# Patient Record
Sex: MALE | Race: ASIAN | Employment: FULL TIME | URBAN - METROPOLITAN AREA
[De-identification: names, ages, dates, MRNs, and addresses within clinical notes are randomized per-mention and may not be internally consistent; named-entity substitution may affect disease eponyms.]

---

## 2017-05-13 RX ORDER — VALSARTAN 160 MG/1
TABLET ORAL
Qty: 90 TABLET | Refills: 3 | Status: SHIPPED | OUTPATIENT
Start: 2017-05-13 | End: 2018-05-19

## 2018-05-21 RX ORDER — VALSARTAN 160 MG/1
TABLET ORAL
Qty: 90 TABLET | Refills: 0 | Status: SHIPPED | OUTPATIENT
Start: 2018-05-21 | End: 2018-07-12

## 2018-05-21 NOTE — TELEPHONE ENCOUNTER
Call center please call and schedule an appointment. Thank You.     PLEASE INFORM PATIENT, OFFICE VISIT REQUIRED PRIOR TO ANY ADDITIONAL REFILLS   (Routing comment)

## 2018-07-12 ENCOUNTER — OFFICE VISIT (OUTPATIENT)
Dept: INTERNAL MEDICINE CLINIC | Facility: CLINIC | Age: 48
End: 2018-07-12

## 2018-07-12 VITALS
DIASTOLIC BLOOD PRESSURE: 84 MMHG | BODY MASS INDEX: 30.92 KG/M2 | WEIGHT: 204 LBS | SYSTOLIC BLOOD PRESSURE: 132 MMHG | HEART RATE: 69 BPM | HEIGHT: 68 IN | TEMPERATURE: 98 F

## 2018-07-12 DIAGNOSIS — I10 ESSENTIAL HYPERTENSION: ICD-10-CM

## 2018-07-12 DIAGNOSIS — L72.3 SEBACEOUS CYST: ICD-10-CM

## 2018-07-12 DIAGNOSIS — Z00.00 ROUTINE GENERAL MEDICAL EXAMINATION AT A HEALTH CARE FACILITY: Primary | ICD-10-CM

## 2018-07-12 PROCEDURE — 99396 PREV VISIT EST AGE 40-64: CPT | Performed by: INTERNAL MEDICINE

## 2018-07-12 RX ORDER — VALSARTAN 160 MG/1
TABLET ORAL
Qty: 90 TABLET | Refills: 3 | Status: SHIPPED | OUTPATIENT
Start: 2018-07-12 | End: 2018-08-20

## 2018-07-12 NOTE — PROGRESS NOTES
HPI:    Patient ID: Freddy Renae is a 53year old male.     HPI     Physical exam    /84   Pulse 69   Temp 98.1 °F (36.7 °C) (Oral)   Ht 5' 8\" (1.727 m)   Wt 204 lb (92.5 kg)   BMI 31.02 kg/m²   Wt Readings from Last 6 Encounters:  07/12/18 Mother    • Hypertension Mother    • Diabetes Maternal Grandfather       Social History: Smoking status: Never Smoker                                                              Smokeless tobacco: Never Used                      Alcohol use:  Yes (92.5 kg)   BMI 31.02 kg/m²   Controlled  CPM  Low salt diet  WT loss  Exercise advsed    Medication reviewed. Refill will be given as needed . Mediation side effect reviewed. Most recent laboratory and diagnostic testing reviewed.   Dietary and lifestyle

## 2018-08-20 RX ORDER — VALSARTAN 160 MG/1
TABLET ORAL
Qty: 90 TABLET | Refills: 0 | OUTPATIENT
Start: 2018-08-20

## 2018-10-01 ENCOUNTER — TELEPHONE (OUTPATIENT)
Dept: OTHER | Age: 48
End: 2018-10-01

## 2018-10-01 NOTE — TELEPHONE ENCOUNTER
Patient states he switched BP meds about two weeks ago from valsartan to losartan, and states that the losartan hasn't been working well. He states his BP:154/108 on last Thursday.  He states that usually his BP ran about 136/85 on Valsartan, but now on los

## 2018-10-02 NOTE — TELEPHONE ENCOUNTER
Increased losartan yesterday  Sent to pharmacy  Monitor home BP  Follow up in a month  Low salt diet advised

## 2019-10-06 RX ORDER — LOSARTAN POTASSIUM 100 MG/1
TABLET ORAL
Qty: 90 TABLET | Refills: 1 | Status: SHIPPED | OUTPATIENT
Start: 2019-10-06 | End: 2020-03-28

## 2019-10-06 NOTE — TELEPHONE ENCOUNTER
Please review; protocol failed.     Requested Prescriptions   Pending Prescriptions Disp Refills   • LOSARTAN 100 MG Oral Tab [Pharmacy Med Name: LOSARTAN 100MG TABLETS] 90 tablet 0     Sig: TAKE 1 TABLET(100 MG) BY MOUTH DAILY       Hypertensive Medication

## 2020-03-28 RX ORDER — LOSARTAN POTASSIUM 100 MG/1
TABLET ORAL
Qty: 90 TABLET | Refills: 1 | Status: SHIPPED | OUTPATIENT
Start: 2020-03-28 | End: 2020-09-17

## 2020-03-28 NOTE — TELEPHONE ENCOUNTER
BS 53; rechecked BS 50. Dr Shante Orozco notified. Pt denies c/o's; assymptomatic. Given cranberry juice 240mls and saltines.   Will recheck Please review refill

## 2020-09-16 ENCOUNTER — TELEPHONE (OUTPATIENT)
Dept: INTERNAL MEDICINE CLINIC | Facility: CLINIC | Age: 50
End: 2020-09-16

## 2020-09-23 ENCOUNTER — OFFICE VISIT (OUTPATIENT)
Dept: INTERNAL MEDICINE CLINIC | Facility: CLINIC | Age: 50
End: 2020-09-23

## 2020-09-23 VITALS
HEART RATE: 90 BPM | HEIGHT: 68 IN | TEMPERATURE: 99 F | DIASTOLIC BLOOD PRESSURE: 88 MMHG | WEIGHT: 195 LBS | BODY MASS INDEX: 29.55 KG/M2 | SYSTOLIC BLOOD PRESSURE: 148 MMHG

## 2020-09-23 DIAGNOSIS — I10 ESSENTIAL HYPERTENSION: ICD-10-CM

## 2020-09-23 DIAGNOSIS — Z12.5 SCREENING FOR PROSTATE CANCER: ICD-10-CM

## 2020-09-23 DIAGNOSIS — Z00.00 ROUTINE GENERAL MEDICAL EXAMINATION AT A HEALTH CARE FACILITY: Primary | ICD-10-CM

## 2020-09-23 PROCEDURE — 3008F BODY MASS INDEX DOCD: CPT | Performed by: INTERNAL MEDICINE

## 2020-09-23 PROCEDURE — 3077F SYST BP >= 140 MM HG: CPT | Performed by: INTERNAL MEDICINE

## 2020-09-23 PROCEDURE — 99396 PREV VISIT EST AGE 40-64: CPT | Performed by: INTERNAL MEDICINE

## 2020-09-23 PROCEDURE — 90471 IMMUNIZATION ADMIN: CPT | Performed by: INTERNAL MEDICINE

## 2020-09-23 PROCEDURE — 3079F DIAST BP 80-89 MM HG: CPT | Performed by: INTERNAL MEDICINE

## 2020-09-23 PROCEDURE — 90686 IIV4 VACC NO PRSV 0.5 ML IM: CPT | Performed by: INTERNAL MEDICINE

## 2020-09-23 RX ORDER — AMLODIPINE BESYLATE 5 MG/1
5 TABLET ORAL DAILY
Qty: 90 TABLET | Refills: 3 | Status: SHIPPED | OUTPATIENT
Start: 2020-09-23 | End: 2021-09-07

## 2020-09-23 NOTE — PROGRESS NOTES
HPI:    Patient ID: Maricruz Ward is a 53year old male.     HPI    Physical exam  HTN  Long standing history of hypertension     sympotms  :        Headache no  dizziness        no                             Blurred vision no  palpitaionsSyncope n daily. 90 tablet 3   • losartan 100 MG Oral Tab Take 1 tablet (100 mg total) by mouth daily.  90 tablet 1   • Fluocinonide 0.05 % External Cream Apply topically twice per day to affected area (Patient not taking: Reported on 9/23/2020 ) 120 g 3     Allergie Routine general medical examination at a health care facility  (primary encounter diagnosis)  Plan: ASSAY, THYROID STIM HORMONE, FREE T4 (FREE         THYROXINE), LIPID PANEL, CBC, PLATELET; NO         DIFFERENTIAL, COMP METABOLIC PANEL (14),         HEMOG

## 2020-09-25 ENCOUNTER — LAB ENCOUNTER (OUTPATIENT)
Dept: LAB | Age: 50
End: 2020-09-25
Attending: INTERNAL MEDICINE
Payer: COMMERCIAL

## 2020-09-25 DIAGNOSIS — Z00.00 ROUTINE GENERAL MEDICAL EXAMINATION AT A HEALTH CARE FACILITY: ICD-10-CM

## 2020-09-25 DIAGNOSIS — Z12.5 SCREENING FOR PROSTATE CANCER: ICD-10-CM

## 2020-09-25 LAB
ALBUMIN SERPL-MCNC: 3.9 G/DL (ref 3.4–5)
ALBUMIN/GLOB SERPL: 1 {RATIO} (ref 1–2)
ALP LIVER SERPL-CCNC: 52 U/L
ALT SERPL-CCNC: 38 U/L
ANION GAP SERPL CALC-SCNC: 8 MMOL/L (ref 0–18)
AST SERPL-CCNC: 27 U/L (ref 15–37)
BACTERIA UR QL AUTO: NEGATIVE /HPF
BILIRUB SERPL-MCNC: 0.5 MG/DL (ref 0.1–2)
BUN BLD-MCNC: 19 MG/DL (ref 7–18)
BUN/CREAT SERPL: 15.3 (ref 10–20)
CALCIUM BLD-MCNC: 9.3 MG/DL (ref 8.5–10.1)
CHLORIDE SERPL-SCNC: 106 MMOL/L (ref 98–112)
CHOLEST SMN-MCNC: 219 MG/DL (ref ?–200)
CO2 SERPL-SCNC: 24 MMOL/L (ref 21–32)
COMPLEXED PSA SERPL-MCNC: 0.99 NG/ML (ref ?–4)
CREAT BLD-MCNC: 1.24 MG/DL
DEPRECATED RDW RBC AUTO: 37.6 FL (ref 35.1–46.3)
ERYTHROCYTE [DISTWIDTH] IN BLOOD BY AUTOMATED COUNT: 10.8 % (ref 11–15)
EST. AVERAGE GLUCOSE BLD GHB EST-MCNC: 114 MG/DL (ref 68–126)
GLOBULIN PLAS-MCNC: 3.9 G/DL (ref 2.8–4.4)
GLUCOSE BLD-MCNC: 147 MG/DL (ref 70–99)
HBA1C MFR BLD HPLC: 5.6 % (ref ?–5.7)
HCT VFR BLD AUTO: 41.4 %
HDLC SERPL-MCNC: 39 MG/DL (ref 40–59)
HGB BLD-MCNC: 14.3 G/DL
LDLC SERPL DIRECT ASSAY-MCNC: 84 MG/DL (ref ?–100)
M PROTEIN MFR SERPL ELPH: 7.8 G/DL (ref 6.4–8.2)
MCH RBC QN AUTO: 32.7 PG (ref 26–34)
MCHC RBC AUTO-ENTMCNC: 34.5 G/DL (ref 31–37)
MCV RBC AUTO: 94.7 FL
NONHDLC SERPL-MCNC: 180 MG/DL (ref ?–130)
OSMOLALITY SERPL CALC.SUM OF ELEC: 291 MOSM/KG (ref 275–295)
PATIENT FASTING Y/N/NP: YES
PATIENT FASTING Y/N/NP: YES
PLATELET # BLD AUTO: 203 10(3)UL (ref 150–450)
POTASSIUM SERPL-SCNC: 4 MMOL/L (ref 3.5–5.1)
RBC # BLD AUTO: 4.37 X10(6)UL
RBC #/AREA URNS AUTO: <1 /HPF
SODIUM SERPL-SCNC: 138 MMOL/L (ref 136–145)
T4 FREE SERPL-MCNC: 0.8 NG/DL (ref 0.8–1.7)
TRIGL SERPL-MCNC: 862 MG/DL (ref 30–149)
TSI SER-ACNC: 2.85 MIU/ML (ref 0.36–3.74)
WBC # BLD AUTO: 7 X10(3) UL (ref 4–11)
WBC #/AREA URNS AUTO: <1 /HPF

## 2020-09-25 PROCEDURE — 84439 ASSAY OF FREE THYROXINE: CPT

## 2020-09-25 PROCEDURE — 81015 MICROSCOPIC EXAM OF URINE: CPT

## 2020-09-25 PROCEDURE — 80061 LIPID PANEL: CPT

## 2020-09-25 PROCEDURE — 36415 COLL VENOUS BLD VENIPUNCTURE: CPT

## 2020-09-25 PROCEDURE — 83721 ASSAY OF BLOOD LIPOPROTEIN: CPT

## 2020-09-25 PROCEDURE — 85027 COMPLETE CBC AUTOMATED: CPT

## 2020-09-25 PROCEDURE — 84443 ASSAY THYROID STIM HORMONE: CPT

## 2020-09-25 PROCEDURE — 83036 HEMOGLOBIN GLYCOSYLATED A1C: CPT

## 2020-09-25 PROCEDURE — 80053 COMPREHEN METABOLIC PANEL: CPT

## 2021-03-16 RX ORDER — LOSARTAN POTASSIUM 100 MG/1
TABLET ORAL
Qty: 90 TABLET | Refills: 1 | Status: SHIPPED | OUTPATIENT
Start: 2021-03-16 | End: 2021-09-07

## 2021-09-07 RX ORDER — LOSARTAN POTASSIUM 100 MG/1
TABLET ORAL
Qty: 90 TABLET | Refills: 1 | Status: SHIPPED | OUTPATIENT
Start: 2021-09-07

## 2021-09-07 RX ORDER — AMLODIPINE BESYLATE 5 MG/1
TABLET ORAL
Qty: 90 TABLET | Refills: 3 | Status: SHIPPED | OUTPATIENT
Start: 2021-09-07

## 2021-11-15 ENCOUNTER — OFFICE VISIT (OUTPATIENT)
Dept: INTERNAL MEDICINE CLINIC | Facility: CLINIC | Age: 51
End: 2021-11-15

## 2021-11-15 VITALS
DIASTOLIC BLOOD PRESSURE: 91 MMHG | HEART RATE: 76 BPM | SYSTOLIC BLOOD PRESSURE: 151 MMHG | BODY MASS INDEX: 30.16 KG/M2 | WEIGHT: 199 LBS | HEIGHT: 68 IN

## 2021-11-15 DIAGNOSIS — K21.9 GASTROESOPHAGEAL REFLUX DISEASE, UNSPECIFIED WHETHER ESOPHAGITIS PRESENT: ICD-10-CM

## 2021-11-15 DIAGNOSIS — D22.9 NUMEROUS SKIN MOLES: ICD-10-CM

## 2021-11-15 DIAGNOSIS — Z00.00 ROUTINE GENERAL MEDICAL EXAMINATION AT A HEALTH CARE FACILITY: Primary | ICD-10-CM

## 2021-11-15 DIAGNOSIS — Z12.11 COLON CANCER SCREENING: ICD-10-CM

## 2021-11-15 PROCEDURE — 99396 PREV VISIT EST AGE 40-64: CPT | Performed by: INTERNAL MEDICINE

## 2021-11-15 PROCEDURE — 3008F BODY MASS INDEX DOCD: CPT | Performed by: INTERNAL MEDICINE

## 2021-11-15 PROCEDURE — 90686 IIV4 VACC NO PRSV 0.5 ML IM: CPT | Performed by: INTERNAL MEDICINE

## 2021-11-15 PROCEDURE — 90471 IMMUNIZATION ADMIN: CPT | Performed by: INTERNAL MEDICINE

## 2021-11-15 PROCEDURE — 3077F SYST BP >= 140 MM HG: CPT | Performed by: INTERNAL MEDICINE

## 2021-11-15 PROCEDURE — 3080F DIAST BP >= 90 MM HG: CPT | Performed by: INTERNAL MEDICINE

## 2021-11-22 NOTE — PROGRESS NOTES
HPI:    Patient ID: Kierra Cheng is a 49year old male.     HPI    Physical exam    Elevated BP  High triglycerides    BP (!) 151/91   Pulse 76   Ht 5' 8\" (1.727 m)   Wt 199 lb (90.3 kg)   BMI 30.26 kg/m²   Wt Readings from Last 6 Encounters:  11/ COPD   • Diabetes Mother    • Hypertension Mother    • Diabetes Maternal Grandfather       Social History: Social History    Tobacco Use      Smoking status: Never Smoker      Smokeless tobacco: Never Used    Alcohol use: Yes      Comment: socially (14),         HEMOGLOBIN A1C, URINE MICROSCOPIC W REFLEX         CULTURE, PSA SCREEN, CANCELED: MIKALA SCREENING         BILAT (CPT=77067)        Danger of high triglycerides  And risk of hemorrhagic panceatitis adivsed  Avoid alcohol use    (D22.9) Numerous

## 2022-03-03 RX ORDER — LOSARTAN POTASSIUM 100 MG/1
100 TABLET ORAL DAILY
Qty: 90 TABLET | Refills: 1 | Status: SHIPPED | OUTPATIENT
Start: 2022-03-03 | End: 2022-08-25

## 2022-03-03 NOTE — TELEPHONE ENCOUNTER
Please review protocol failed/ No protocol    Requested Prescriptions   Pending Prescriptions Disp Refills    LOSARTAN 100 MG Oral Tab [Pharmacy Med Name: LOSARTAN 100MG TABLETS] 90 tablet 1     Sig: TAKE 1 TABLET BY MOUTH DAILY        Hypertensive Medications Protocol Failed - 3/3/2022 11:39 AM        Failed - CMP or BMP in past 12 months        Passed - Appointment in past 6 or next 3 months        Passed - GFR Non- > 50     Lab Results   Component Value Date    GFRNAA 68 09/25/2020                       Recent Outpatient Visits              3 months ago Routine general medical examination at a health care facility    [facility], Yana Garza MD    Office Visit    1 year ago Routine general medical examination at a health care facility    [facility], Yana Garza MD    Office Visit    3 years ago Routine general medical examination at a health care [facility], Herminio Gipson MD    Office Visit    5 years ago Chest pain, unspecified type    Cyndie Goode MD    Office Visit

## 2022-07-02 ENCOUNTER — OFFICE VISIT (OUTPATIENT)
Dept: DERMATOLOGY CLINIC | Facility: CLINIC | Age: 52
End: 2022-07-02
Payer: COMMERCIAL

## 2022-07-02 DIAGNOSIS — Q80.0 ICHTHYOSIS VULGARIS: ICD-10-CM

## 2022-07-02 DIAGNOSIS — D22.9 MULTIPLE NEVI: ICD-10-CM

## 2022-07-02 DIAGNOSIS — D23.9 BENIGN NEOPLASM OF SKIN, UNSPECIFIED LOCATION: ICD-10-CM

## 2022-07-02 DIAGNOSIS — L30.9 DERMATITIS: ICD-10-CM

## 2022-07-02 DIAGNOSIS — D49.2: Primary | ICD-10-CM

## 2022-07-02 DIAGNOSIS — L85.8 KERATOSIS PILARIS: ICD-10-CM

## 2022-07-02 PROCEDURE — 99203 OFFICE O/P NEW LOW 30 MIN: CPT | Performed by: DERMATOLOGY

## 2022-07-02 RX ORDER — TAZAROTENE 1 MG/G
GEL CUTANEOUS
Qty: 30 G | Refills: 1 | Status: SHIPPED | OUTPATIENT
Start: 2022-07-02

## 2022-08-25 RX ORDER — AMLODIPINE BESYLATE 5 MG/1
5 TABLET ORAL DAILY
Qty: 90 TABLET | Refills: 1 | Status: SHIPPED | OUTPATIENT
Start: 2022-08-25

## 2022-08-25 RX ORDER — LOSARTAN POTASSIUM 100 MG/1
100 TABLET ORAL DAILY
Qty: 90 TABLET | Refills: 0 | Status: SHIPPED | OUTPATIENT
Start: 2022-08-25

## 2022-08-26 NOTE — TELEPHONE ENCOUNTER
Please review refill failed/no protocol     Requested Prescriptions     Pending Prescriptions Disp Refills    AMLODIPINE 5 MG Oral Tab [Pharmacy Med Name: AMLODIPINE BESYLATE 5MG TABLETS] 90 tablet 3     Sig: TAKE 1 TABLET(5 MG) BY MOUTH DAILY         Recent Visits  Date Type Provider Dept   11/15/21 Office Visit Liza Pinto MD [facility]   Showing recent visits within past 540 days with a meds authorizing provider and meeting all other requirements  Future Appointments  No visits were found meeting these conditions. Showing future appointments within next 150 days with a meds authorizing provider and meeting all other requirements    Requested Prescriptions   Pending Prescriptions Disp Refills    AMLODIPINE 5 MG Oral Tab [Pharmacy Med Name: AMLODIPINE BESYLATE 5MG TABLETS] 90 tablet 3     Sig: TAKE 1 TABLET(5 MG) BY MOUTH DAILY        Hypertensive Medications Protocol Failed - 8/24/2022 11:52 AM        Failed - Last BP reading less than 140/90     BP Readings from Last 1 Encounters:  11/15/21 : (!) 151/91                Failed - CMP or BMP in past 6 months     No results found for this or any previous visit (from the past 4392 hour(s)).               Failed - In person appointment or virtual visit in the past 6 months       Recent Outpatient Visits              1 month ago Neoplasm of soft tissue of buttock    [facility] Neena Mckoy MD    Office Visit    9 months ago Routine general medical examination at a health care facility    [place] [facility] [facility], Jose Portillo MD    Office Visit    1 year ago Routine general medical examination at a health care facility    [place] [employer] [facility], [address], Winifred Klinefelter, MD    Office Visit    4 years ago Routine general medical examination at a health care facility    [place] [employer] [facility], Dia Weir MD    Office Visit    6 years ago Chest pain, unspecified type    [place] [employer] [facility], Dia Weir MD    Office Visit                 Failed - GFR > 50     No results found for: [facility]              Passed - In person appointment in the past 12 or next 3 months       Recent Outpatient Visits              1 month ago Neoplasm of soft tissue of buttock    [facility] Dermatology Neena Mckoy MD    Office Visit    9 months ago Routine general medical examination at a health care facility    [address] [place] [address] [address], Winifred Klinefelter, MD    Office Visit    1 year ago Routine general medical examination at a health care facility    [address] [place] [address] [address], Winifred Klinefelter, MD    Office Visit    4 years ago Routine general medical examination at a health care facility    [address] [place] [address] Chelsey, Deisy Ross MD    Office Visit    6 years ago Chest pain, unspecified type    Alma Stoll MD    Office Visit                       Recent Outpatient Visits              1 month ago Neoplasm of soft tissue of buttock    [facility] Dermatology Neena Mckoy MD    Office Visit    9 months ago Routine general medical examination at a health care facility    [address] [place] [address] [place] Jose Barbosa MD    Office Visit    1 year ago Routine general medical examination at a health care facility    [address] [place] [address] [address], Winifred Klinefelter, MD    Office Visit    4 years ago Routine general medical examination at a health care facility    [address] [place] [address] Dia Barbosa MD    Office Visit    6 years ago Chest pain, unspecified type    Alma Stoll MD    Office Visit

## 2022-11-14 ENCOUNTER — LAB ENCOUNTER (OUTPATIENT)
Dept: LAB | Age: 52
End: 2022-11-14
Attending: INTERNAL MEDICINE
Payer: COMMERCIAL

## 2022-11-14 DIAGNOSIS — Z00.00 ROUTINE GENERAL MEDICAL EXAMINATION AT A HEALTH CARE FACILITY: ICD-10-CM

## 2022-11-14 LAB
ALBUMIN SERPL-MCNC: 4.7 G/DL (ref 3.4–5)
ALBUMIN/GLOB SERPL: 1.6 {RATIO} (ref 1–2)
ALP LIVER SERPL-CCNC: 59 U/L
ALT SERPL-CCNC: 52 U/L
ANION GAP SERPL CALC-SCNC: 13 MMOL/L (ref 0–18)
AST SERPL-CCNC: 27 U/L (ref 15–37)
BILIRUB SERPL-MCNC: 1 MG/DL (ref 0.1–2)
BUN BLD-MCNC: 17 MG/DL (ref 7–18)
BUN/CREAT SERPL: 15.3 (ref 10–20)
CALCIUM BLD-MCNC: 9.6 MG/DL (ref 8.5–10.1)
CHLORIDE SERPL-SCNC: 100 MMOL/L (ref 98–112)
CHOLEST SERPL-MCNC: 219 MG/DL (ref ?–200)
CO2 SERPL-SCNC: 24 MMOL/L (ref 21–32)
COMPLEXED PSA SERPL-MCNC: 0.86 NG/ML (ref ?–4)
CREAT BLD-MCNC: 1.11 MG/DL
DEPRECATED RDW RBC AUTO: 38 FL (ref 35.1–46.3)
ERYTHROCYTE [DISTWIDTH] IN BLOOD BY AUTOMATED COUNT: 11 % (ref 11–15)
EST. AVERAGE GLUCOSE BLD GHB EST-MCNC: 177 MG/DL (ref 68–126)
FASTING PATIENT LIPID ANSWER: YES
FASTING STATUS PATIENT QL REPORTED: YES
GFR SERPLBLD BASED ON 1.73 SQ M-ARVRAT: 80 ML/MIN/1.73M2 (ref 60–?)
GLOBULIN PLAS-MCNC: 3 G/DL (ref 2.8–4.4)
GLUCOSE BLD-MCNC: 276 MG/DL (ref 70–99)
HBA1C MFR BLD: 7.8 % (ref ?–5.7)
HCT VFR BLD AUTO: 48.2 %
HDLC SERPL-MCNC: 61 MG/DL (ref 40–59)
HGB BLD-MCNC: 17.1 G/DL
LDLC SERPL CALC-MCNC: 97 MG/DL (ref ?–100)
MCH RBC QN AUTO: 33.5 PG (ref 26–34)
MCHC RBC AUTO-ENTMCNC: 35.5 G/DL (ref 31–37)
MCV RBC AUTO: 94.3 FL
NONHDLC SERPL-MCNC: 158 MG/DL (ref ?–130)
OSMOLALITY SERPL CALC.SUM OF ELEC: 295 MOSM/KG (ref 275–295)
PLATELET # BLD AUTO: 195 10(3)UL (ref 150–450)
POTASSIUM SERPL-SCNC: 4.1 MMOL/L (ref 3.5–5.1)
PROT SERPL-MCNC: 7.7 G/DL (ref 6.4–8.2)
RBC # BLD AUTO: 5.11 X10(6)UL
SODIUM SERPL-SCNC: 137 MMOL/L (ref 136–145)
T4 FREE SERPL-MCNC: 0.9 NG/DL (ref 0.8–1.7)
TRIGL SERPL-MCNC: 366 MG/DL (ref 30–149)
TSI SER-ACNC: 2.25 MIU/ML (ref 0.36–3.74)
VLDLC SERPL CALC-MCNC: 61 MG/DL (ref 0–30)
WBC # BLD AUTO: 5.7 X10(3) UL (ref 4–11)

## 2022-11-14 PROCEDURE — 84439 ASSAY OF FREE THYROXINE: CPT

## 2022-11-14 PROCEDURE — 84443 ASSAY THYROID STIM HORMONE: CPT

## 2022-11-14 PROCEDURE — 36415 COLL VENOUS BLD VENIPUNCTURE: CPT

## 2022-11-14 PROCEDURE — 3051F HG A1C>EQUAL 7.0%<8.0%: CPT | Performed by: INTERNAL MEDICINE

## 2022-11-14 PROCEDURE — 83036 HEMOGLOBIN GLYCOSYLATED A1C: CPT

## 2022-11-14 PROCEDURE — 80061 LIPID PANEL: CPT

## 2022-11-14 PROCEDURE — 81015 MICROSCOPIC EXAM OF URINE: CPT

## 2022-11-14 PROCEDURE — 80053 COMPREHEN METABOLIC PANEL: CPT

## 2022-11-14 PROCEDURE — 85027 COMPLETE CBC AUTOMATED: CPT

## 2022-11-16 ENCOUNTER — OFFICE VISIT (OUTPATIENT)
Dept: INTERNAL MEDICINE CLINIC | Facility: CLINIC | Age: 52
End: 2022-11-16
Payer: COMMERCIAL

## 2022-11-16 VITALS
DIASTOLIC BLOOD PRESSURE: 82 MMHG | HEART RATE: 83 BPM | BODY MASS INDEX: 29.55 KG/M2 | HEIGHT: 68 IN | SYSTOLIC BLOOD PRESSURE: 138 MMHG | WEIGHT: 195 LBS

## 2022-11-16 DIAGNOSIS — E78.5 HYPERLIPIDEMIA, UNSPECIFIED HYPERLIPIDEMIA TYPE: ICD-10-CM

## 2022-11-16 DIAGNOSIS — Z00.00 PHYSICAL EXAM: Primary | ICD-10-CM

## 2022-11-16 DIAGNOSIS — I10 ESSENTIAL HYPERTENSION: ICD-10-CM

## 2022-11-16 DIAGNOSIS — E11.9 TYPE 2 DIABETES MELLITUS WITHOUT COMPLICATION, WITHOUT LONG-TERM CURRENT USE OF INSULIN (HCC): ICD-10-CM

## 2022-11-16 PROCEDURE — 90686 IIV4 VACC NO PRSV 0.5 ML IM: CPT | Performed by: INTERNAL MEDICINE

## 2022-11-16 PROCEDURE — 90472 IMMUNIZATION ADMIN EACH ADD: CPT | Performed by: INTERNAL MEDICINE

## 2022-11-16 PROCEDURE — 99396 PREV VISIT EST AGE 40-64: CPT | Performed by: INTERNAL MEDICINE

## 2022-11-16 PROCEDURE — 90677 PCV20 VACCINE IM: CPT | Performed by: INTERNAL MEDICINE

## 2022-11-16 PROCEDURE — 3008F BODY MASS INDEX DOCD: CPT | Performed by: INTERNAL MEDICINE

## 2022-11-16 PROCEDURE — 3075F SYST BP GE 130 - 139MM HG: CPT | Performed by: INTERNAL MEDICINE

## 2022-11-16 PROCEDURE — 90471 IMMUNIZATION ADMIN: CPT | Performed by: INTERNAL MEDICINE

## 2022-11-16 PROCEDURE — 3079F DIAST BP 80-89 MM HG: CPT | Performed by: INTERNAL MEDICINE

## 2022-11-21 ENCOUNTER — TELEPHONE (OUTPATIENT)
Dept: INTERNAL MEDICINE CLINIC | Facility: CLINIC | Age: 52
End: 2022-11-21

## 2022-11-21 DIAGNOSIS — E11.9 TYPE 2 DIABETES MELLITUS WITHOUT COMPLICATION, WITHOUT LONG-TERM CURRENT USE OF INSULIN (HCC): Primary | ICD-10-CM

## 2022-11-21 RX ORDER — BLOOD-GLUCOSE METER
1 EACH MISCELLANEOUS DAILY
Qty: 1 KIT | Refills: 0 | Status: SHIPPED | OUTPATIENT
Start: 2022-11-21

## 2022-11-21 RX ORDER — LANCETS 33 GAUGE
100 EACH MISCELLANEOUS DAILY
Qty: 100 EACH | Refills: 3 | Status: SHIPPED | OUTPATIENT
Start: 2022-11-21

## 2022-11-21 RX ORDER — BLOOD SUGAR DIAGNOSTIC
1 STRIP MISCELLANEOUS DAILY
Qty: 100 EACH | Refills: 3 | Status: SHIPPED | OUTPATIENT
Start: 2022-11-21

## 2022-11-27 RX ORDER — LOSARTAN POTASSIUM 100 MG/1
100 TABLET ORAL DAILY
Qty: 90 TABLET | Refills: 1 | Status: SHIPPED | OUTPATIENT
Start: 2022-11-27

## 2022-11-27 NOTE — TELEPHONE ENCOUNTER
Refill passed per [facility] protocol   Requested Prescriptions   Pending Prescriptions Disp Refills    LOSARTAN 100 MG Oral Tab [Pharmacy Med Name: LOSARTAN 100MG TABLETS] 90 tablet 0     Sig: TAKE 1 TABLET(100 MG) BY MOUTH DAILY       Hypertensive Medications Protocol Passed - 11/27/2022 12:27 PM        Passed - In person appointment in the past 12 or next 3 months     Recent Outpatient Visits              1 week ago Physical exam    Claudia Graves MD    Office Visit    4 months ago Neoplasm of soft tissue of buttock    [facility] Derm[facility] Antoine Rubio MD    Office Visit    1 year ago Routine general medical examination at a health care facility    [address] [place] [address] ShastaAndree Lock MD    Office Visit    2 years ago Routine general medical examination at a health care facility    [address] [place] [address] [place] Andree Barbosa MD    Office Visit    4 years ago Routine general medical examination at a health care facility    [facility], [address] [facility] Blayne Sharif MD    Office Visit          Future Appointments         Provider Department Appt Notes    In 3 weeks LEANDER RN RADIOLOGY 1; [address] - [place] plaque buildup               Passed - Last BP reading less than 140/90     BP Readings from Last 1 Encounters:  11/16/22 : 138/82              Passed - CMP or BMP in past 6 months     Recent Results (from the past 4392 hour(s))   COMP METABOLIC PANEL (14)    Collection Time: 11/14/22  9:42 AM   Result Value Ref Range    Glucose 276 (H) 70 - 99 mg/dL    Sodium 137 136 - 145 mmol/L    Potassium 4.1 3.5 - 5.1 mmol/L    Chloride 100 98 - 112 mmol/L    CO2 24.0 21.0 - 32.0 mmol/L    Anion Gap 13 0 - 18 mmol/L    BUN 17 7 - 18 mg/dL    Creatinine 1.11 0.70 - 1.30 mg/dL    BUN/CREA Ratio 15.3 10.0 - 20.0    Calcium, Total 9.6 8.5 - 10.1 mg/dL    Calculated Osmolality 295 275 - 295 mOsm/kg eGFR-Cr 80 >=60 mL/min/1.73m2    ALT 52 16 - 61 U/L    AST 27 15 - 37 U/L    Alkaline Phosphatase 59 45 - 117 U/L    Bilirubin, Total 1.0 0.1 - 2.0 mg/dL    Total Protein 7.7 6.4 - 8.2 g/dL    Albumin 4.7 3.4 - 5.0 g/dL    Globulin  3.0 2.8 - 4.4 g/dL    A/G Ratio 1.6 1.0 - 2.0    Patient Fasting for CMP? Yes      *Note: Due to a large number of results and/or encounters for the requested time period, some results have not been displayed. A complete set of results can be found in Results Review.                Passed - In person appointment or virtual visit in the past 6 months     Recent Outpatient Visits              1 week ago Physical exam    Vandana Randall MD    Office Visit    4 months ago Neoplasm of soft tissue of buttock    [facility] Nola Paez MD    Office Visit    1 year ago Routine general medical examination at a health care facility    [address] [place] [address] [place], [facility], Brit Amaya MD    Office Visit    2 years ago Routine general medical examination at a health care facility    [address] [place] [address] Chelsey, Karen Dubois MD    Office Visit    4 years ago Routine general medical examination at a health care facility    [facility], [address] [facility], Bernard Martinez MD    Office Visit          Future Appointments         Provider Department Appt Notes    In 3 weeks PINGB RN RADIOLOGY 1; [address] - [place] plaque buildup               Passed - [facility] or GFRNAA > 50     GFR Evaluation  EGFRCR: 80 , resulted on 11/14/2022

## 2022-12-02 RX ORDER — AMLODIPINE BESYLATE 5 MG/1
5 TABLET ORAL DAILY
Qty: 90 TABLET | Refills: 1 | Status: SHIPPED | OUTPATIENT
Start: 2022-12-02

## 2022-12-02 NOTE — TELEPHONE ENCOUNTER
Refill passed per [facility] protocol.   Requested Prescriptions   Pending Prescriptions Disp Refills    AMLODIPINE 5 MG Oral Tab [Pharmacy Med Name: AMLODIPINE BESYLATE 5MG TABLETS] 90 tablet 1     Sig: TAKE 1 TABLET(5 MG) BY MOUTH DAILY       Hypertensive Medications Protocol Passed - 12/2/2022  8:02 AM        Passed - In person appointment in the past 12 or next 3 months     Recent Outpatient Visits              2 weeks ago Physical exam    Murleen Sandifer, MD    Office Visit    5 months ago Neoplasm of soft tissue of buttock    [facility] D[facility] Leighton Briones MD    Office Visit    1 year ago Routine general medical examination at a health care facility    [facility]    Office Visit    2 years ago Routine general medical examination at a health care facility    [facility], Dorothy Davis MD    Office Visit    4 years ago Routine general medical examination at a health care facility    [facility], [address] [place] [address] Nima Sharif MD    Office Visit          Future Appointments         Provider Department Appt Notes    In 2 weeks LEANDER RN RADIOLOGY 1; [address] - [place] plaque buildup               Passed - Last BP reading less than 140/90     BP Readings from Last 1 Encounters:  11/16/22 : 138/82              Passed - CMP or BMP in past 6 months     Recent Results (from the past 4392 hour(s))   COMP METABOLIC PANEL (14)    Collection Time: 11/14/22  9:42 AM   Result Value Ref Range    Glucose 276 (H) 70 - 99 mg/dL    Sodium 137 136 - 145 mmol/L    Potassium 4.1 3.5 - 5.1 mmol/L    Chloride 100 98 - 112 mmol/L    CO2 24.0 21.0 - 32.0 mmol/L    Anion Gap 13 0 - 18 mmol/L    BUN 17 7 - 18 mg/dL    Creatinine 1.11 0.70 - 1.30 mg/dL    BUN/CREA Ratio 15.3 10.0 - 20.0    Calcium, Total 9.6 8.5 - 10.1 mg/dL    Calculated Osmolality 295 275 - 295 mOsm/kg    eGFR-Cr 80 >=60 mL/min/1.73m2    ALT 52 16 - 61 U/L    AST 27 15 - 37 U/L    Alkaline Phosphatase 59 45 - 117 U/L    Bilirubin, Total 1.0 0.1 - 2.0 mg/dL    Total Protein 7.7 6.4 - 8.2 g/dL    Albumin 4.7 3.4 - 5.0 g/dL    Globulin  3.0 2.8 - 4.4 g/dL    A/G Ratio 1.6 1.0 - 2.0    Patient Fasting for CMP? Yes      *Note: Due to a large number of results and/or encounters for the requested time period, some results have not been displayed. A complete set of results can be found in Results Review.                Passed - In person appointment or virtual visit in the past 6 months     Recent Outpatient Visits              2 weeks ago Physical exam    Jeremiah Winters MD    Office Visit    5 months ago Neoplasm of soft tissue of buttock    [facility] Dermatology Gregory Salguero MD    Office Visit    1 year ago Routine general medical [facility] at a health care [facility], Gilbert Garza MD    Office Visit    2 years ago Routine general medical examination at a health care [facility], Gilbert Garza MD    Office Visit    4 years ago Routine general medical examination at a health care [facility], [address] Jerman Taylor MD    Office Visit          Future Appointments         Provider Department Appt Notes    In 2 weeks LMB RN RADIOLOGY 1; [address] plaque buildup               Passed - EGFRCR or GFRNAA > 50     GFR Evaluation  EGFRCR: 80 , resulted on 11/14/2022             Recent Outpatient Visits              2 weeks ago Physical exam    Jeremiah Winters MD    Office Visit    5 months ago Neoplasm of soft tissue of buttock    [facility] Dermatology Gregory Salguero MD    Office Visit    1 year ago Routine general medical examination at a health care facility Lizbet Sheth MD    Office Visit    2 years ago Routine general medical examination at a health care facility    [facility], [facility], Watson Galaviz MD    Office Visit    4 years ago Routine general medical examination at a health care [facility], [facility], [address] [facility] [place], Andreia Pisano MD    Office Visit          Future Appointments         Provider Department Appt Notes    In 2 weeks PINGB RN RADIOLOGY 1; [address] - [place] plaque buildup

## 2022-12-22 ENCOUNTER — HOSPITAL ENCOUNTER (OUTPATIENT)
Dept: CT IMAGING | Age: 52
Discharge: HOME | End: 2022-12-22
Attending: INTERNAL MEDICINE

## 2022-12-22 VITALS — HEIGHT: 68 IN | BODY MASS INDEX: 29.55 KG/M2 | WEIGHT: 195 LBS

## 2022-12-22 DIAGNOSIS — Z13.6 SCREENING FOR HEART DISEASE: ICD-10-CM

## 2022-12-30 ENCOUNTER — HOSPITAL ENCOUNTER (OUTPATIENT)
Dept: CT IMAGING | Age: 52
Discharge: HOME | End: 2022-12-30
Attending: INTERNAL MEDICINE
Payer: COMMERCIAL

## 2022-12-30 DIAGNOSIS — R93.89 ABNORMAL CHEST CT: ICD-10-CM

## 2022-12-30 LAB
CREAT BLD-MCNC: 1 MG/DL
GFR SERPLBLD BASED ON 1.73 SQ M-ARVRAT: 91 ML/MIN/1.73M2 (ref 60–?)

## 2022-12-30 PROCEDURE — 82565 ASSAY OF CREATININE: CPT

## 2022-12-30 PROCEDURE — 71260 CT THORAX DX C+: CPT | Performed by: INTERNAL MEDICINE

## 2023-01-04 ENCOUNTER — OFFICE VISIT (OUTPATIENT)
Dept: INTERNAL MEDICINE CLINIC | Facility: CLINIC | Age: 53
End: 2023-01-04
Payer: COMMERCIAL

## 2023-01-04 ENCOUNTER — TELEPHONE (OUTPATIENT)
Dept: INTERNAL MEDICINE CLINIC | Facility: CLINIC | Age: 53
End: 2023-01-04

## 2023-01-04 VITALS
BODY MASS INDEX: 29.1 KG/M2 | DIASTOLIC BLOOD PRESSURE: 82 MMHG | HEIGHT: 68 IN | SYSTOLIC BLOOD PRESSURE: 154 MMHG | HEART RATE: 87 BPM | WEIGHT: 192 LBS | OXYGEN SATURATION: 97 %

## 2023-01-04 DIAGNOSIS — H10.32 ACUTE BACTERIAL CONJUNCTIVITIS OF LEFT EYE: Primary | ICD-10-CM

## 2023-01-04 PROCEDURE — 3008F BODY MASS INDEX DOCD: CPT | Performed by: INTERNAL MEDICINE

## 2023-01-04 PROCEDURE — 3077F SYST BP >= 140 MM HG: CPT | Performed by: INTERNAL MEDICINE

## 2023-01-04 PROCEDURE — 3079F DIAST BP 80-89 MM HG: CPT | Performed by: INTERNAL MEDICINE

## 2023-01-04 PROCEDURE — 99213 OFFICE O/P EST LOW 20 MIN: CPT | Performed by: INTERNAL MEDICINE

## 2023-01-04 RX ORDER — CIPROFLOXACIN HYDROCHLORIDE 3.5 MG/ML
SOLUTION/ DROPS TOPICAL
Qty: 5 ML | Refills: 0 | Status: SHIPPED | OUTPATIENT
Start: 2023-01-04 | End: 2023-01-06

## 2023-01-04 NOTE — TELEPHONE ENCOUNTER
Verified name and . Patient states that off-white discharge from left eye, redness, and discomfort to left eye started last night. He is requesting antibiotic eye drops.     Appointment scheduled:  Future Appointments   Date Time Provider Stephanie Melissa   2023 11:00 AM Althea Duvall MD [facility]

## 2023-01-05 ENCOUNTER — PATIENT MESSAGE (OUTPATIENT)
Dept: INTERNAL MEDICINE CLINIC | Facility: CLINIC | Age: 53
End: 2023-01-05

## 2023-01-05 DIAGNOSIS — H10.32 ACUTE BACTERIAL CONJUNCTIVITIS OF LEFT EYE: ICD-10-CM

## 2023-01-06 RX ORDER — CIPROFLOXACIN HYDROCHLORIDE 3.5 MG/ML
SOLUTION/ DROPS TOPICAL
Qty: 5 ML | Refills: 0 | Status: SHIPPED | OUTPATIENT
Start: 2023-01-06

## 2023-01-11 ENCOUNTER — PATIENT MESSAGE (OUTPATIENT)
Dept: INTERNAL MEDICINE CLINIC | Facility: CLINIC | Age: 53
End: 2023-01-11

## 2023-01-11 DIAGNOSIS — E11.9 TYPE 2 DIABETES MELLITUS WITHOUT COMPLICATION, WITHOUT LONG-TERM CURRENT USE OF INSULIN (HCC): ICD-10-CM

## 2023-01-11 NOTE — TELEPHONE ENCOUNTER
From: Lloyd Augustin  To: Chema Olsen MD  Sent: 1/11/2023 10:37 AM CST  Subject: One Touch Ultra Blue Test Strip    Good Morning Dr. Kishor Hightower,    I have been running low on test strip since I have been testing twice a day (morning and evening). Can you please change the usage requirement from once a day to twice? I don't want to run into insurance issue. Thank you.      Carlos Manuel Trevino

## 2023-01-13 RX ORDER — BLOOD SUGAR DIAGNOSTIC
1 STRIP MISCELLANEOUS 2 TIMES DAILY
Qty: 200 EACH | Refills: 3 | Status: SHIPPED | OUTPATIENT
Start: 2023-01-13

## 2023-01-13 NOTE — TELEPHONE ENCOUNTER
Dr. Manzano Heading - pt says he is checking glucose Twice Daily, but per last Office Visit note: Check once daily. Please advise if okay to change frequency to Twice Daily.      Pended Rx for Strips

## 2023-02-10 RX ORDER — METFORMIN HYDROCHLORIDE 500 MG/1
500 TABLET, EXTENDED RELEASE ORAL 2 TIMES DAILY WITH MEALS
Qty: 180 TABLET | Refills: 1 | Status: SHIPPED | OUTPATIENT
Start: 2023-02-10

## 2023-02-10 NOTE — TELEPHONE ENCOUNTER
Please review; protocol failed/ no protocol  Medication pended for your review and approval.     Requested Prescriptions   Pending Prescriptions Disp Refills    METFORMIN  MG Oral Tablet 24 Hr [Pharmacy Med Name: Maykel Sutherland ER 500MG 24HR TABS] 180 tablet 0     Sig: TAKE 1 TABLET(500 MG) BY MOUTH TWICE DAILY WITH MEALS       Diabetes Medication Protocol Failed - 2/9/2023  8:38 AM        Failed - Last A1C < 7.5 and within past 6 months     Lab Results   Component Value Date    A1C 7.8 (H) 11/14/2022             Passed - In person appointment or virtual visit in the past 6 mos or appointment in next 3 mos     Recent Outpatient Visits              1 month ago Acute bacterial conjunctivitis of left eye    Candace Shaw MD    Office Visit    2 months ago Physical exam    Lloyd Tolbert MD    Office Visit    7 months ago Neoplasm of soft tissue of buttock    Symone Shaw MD    Office Visit    1 year ago Routine general medical examination at a health care facility    Dale Heck MD    Office Visit    2 years ago Routine general medical examination at a health care facility    Lloyd Tolbert MD    Office Visit                      Passed - [facility] or GFRNAA > 50     GFR Evaluation  EGFRCR: 91 , resulted on 12/30/2022          Passed - GFR in the past 12 months

## 2023-03-06 ENCOUNTER — TELEPHONE (OUTPATIENT)
Dept: INTERNAL MEDICINE CLINIC | Facility: CLINIC | Age: 53
End: 2023-03-06

## 2023-03-06 DIAGNOSIS — Z12.11 COLON CANCER SCREENING: Primary | ICD-10-CM

## 2023-03-06 NOTE — TELEPHONE ENCOUNTER
[facility] message sent to Pt to schedule a follow up with Dr Alexey Shaw . Pt needs a referral for colon screening , order pended Dr Alexey Shaw please review .

## 2023-03-13 ENCOUNTER — LAB ENCOUNTER (OUTPATIENT)
Dept: LAB | Age: 53
End: 2023-03-13
Attending: INTERNAL MEDICINE
Payer: COMMERCIAL

## 2023-03-13 DIAGNOSIS — E11.9 DIABETES MELLITUS (HCC): Primary | ICD-10-CM

## 2023-03-13 DIAGNOSIS — E11.9 TYPE 2 DIABETES MELLITUS WITHOUT COMPLICATION, WITHOUT LONG-TERM CURRENT USE OF INSULIN (HCC): ICD-10-CM

## 2023-03-13 LAB
ALT SERPL-CCNC: 43 U/L
ANION GAP SERPL CALC-SCNC: 10 MMOL/L (ref 0–18)
AST SERPL-CCNC: 28 U/L (ref 15–37)
BUN BLD-MCNC: 18 MG/DL (ref 7–18)
BUN/CREAT SERPL: 17 (ref 10–20)
CALCIUM BLD-MCNC: 9.5 MG/DL (ref 8.5–10.1)
CHLORIDE SERPL-SCNC: 102 MMOL/L (ref 98–112)
CHOLEST SERPL-MCNC: 186 MG/DL (ref ?–200)
CO2 SERPL-SCNC: 27 MMOL/L (ref 21–32)
CREAT BLD-MCNC: 1.06 MG/DL
EST. AVERAGE GLUCOSE BLD GHB EST-MCNC: 128 MG/DL (ref 68–126)
FASTING PATIENT LIPID ANSWER: YES
FASTING STATUS PATIENT QL REPORTED: YES
GFR SERPLBLD BASED ON 1.73 SQ M-ARVRAT: 85 ML/MIN/1.73M2 (ref 60–?)
GLUCOSE BLD-MCNC: 173 MG/DL (ref 70–99)
HBA1C MFR BLD: 6.1 % (ref ?–5.7)
HDLC SERPL-MCNC: 59 MG/DL (ref 40–59)
LDLC SERPL CALC-MCNC: 82 MG/DL (ref ?–100)
NONHDLC SERPL-MCNC: 127 MG/DL (ref ?–130)
OSMOLALITY SERPL CALC.SUM OF ELEC: 294 MOSM/KG (ref 275–295)
POTASSIUM SERPL-SCNC: 4.2 MMOL/L (ref 3.5–5.1)
SODIUM SERPL-SCNC: 139 MMOL/L (ref 136–145)
TRIGL SERPL-MCNC: 274 MG/DL (ref 30–149)
VLDLC SERPL CALC-MCNC: 44 MG/DL (ref 0–30)

## 2023-03-13 PROCEDURE — 84450 TRANSFERASE (AST) (SGOT): CPT

## 2023-03-13 PROCEDURE — 80048 BASIC METABOLIC PNL TOTAL CA: CPT

## 2023-03-13 PROCEDURE — 3044F HG A1C LEVEL LT 7.0%: CPT | Performed by: INTERNAL MEDICINE

## 2023-03-13 PROCEDURE — 36415 COLL VENOUS BLD VENIPUNCTURE: CPT

## 2023-03-13 PROCEDURE — 83036 HEMOGLOBIN GLYCOSYLATED A1C: CPT

## 2023-03-13 PROCEDURE — 84460 ALANINE AMINO (ALT) (SGPT): CPT

## 2023-03-13 PROCEDURE — 80061 LIPID PANEL: CPT

## 2023-03-16 ENCOUNTER — OFFICE VISIT (OUTPATIENT)
Dept: INTERNAL MEDICINE CLINIC | Facility: CLINIC | Age: 53
End: 2023-03-16

## 2023-03-16 VITALS
HEIGHT: 68 IN | BODY MASS INDEX: 29.55 KG/M2 | HEART RATE: 77 BPM | DIASTOLIC BLOOD PRESSURE: 80 MMHG | WEIGHT: 195 LBS | SYSTOLIC BLOOD PRESSURE: 135 MMHG

## 2023-03-16 DIAGNOSIS — E78.5 HYPERLIPIDEMIA, UNSPECIFIED HYPERLIPIDEMIA TYPE: ICD-10-CM

## 2023-03-16 DIAGNOSIS — I10 ESSENTIAL HYPERTENSION: ICD-10-CM

## 2023-03-16 DIAGNOSIS — E11.9 TYPE 2 DIABETES MELLITUS WITHOUT COMPLICATION, WITHOUT LONG-TERM CURRENT USE OF INSULIN (HCC): Primary | ICD-10-CM

## 2023-03-16 PROCEDURE — 3079F DIAST BP 80-89 MM HG: CPT | Performed by: INTERNAL MEDICINE

## 2023-03-16 PROCEDURE — 3008F BODY MASS INDEX DOCD: CPT | Performed by: INTERNAL MEDICINE

## 2023-03-16 PROCEDURE — 99214 OFFICE O/P EST MOD 30 MIN: CPT | Performed by: INTERNAL MEDICINE

## 2023-03-16 PROCEDURE — 3075F SYST BP GE 130 - 139MM HG: CPT | Performed by: INTERNAL MEDICINE

## 2023-03-16 RX ORDER — AMLODIPINE BESYLATE 5 MG/1
5 TABLET ORAL DAILY
Qty: 90 TABLET | Refills: 1 | Status: SHIPPED | OUTPATIENT
Start: 2023-03-16

## 2023-03-16 RX ORDER — ROSUVASTATIN CALCIUM 10 MG/1
10 TABLET, COATED ORAL NIGHTLY
Qty: 90 TABLET | Refills: 1 | Status: SHIPPED | OUTPATIENT
Start: 2023-03-16 | End: 2024-03-15

## 2023-06-12 RX ORDER — ROSUVASTATIN CALCIUM 10 MG/1
10 TABLET, COATED ORAL NIGHTLY
Qty: 90 TABLET | Refills: 2 | Status: SHIPPED | OUTPATIENT
Start: 2023-06-12

## 2023-06-12 NOTE — TELEPHONE ENCOUNTER
Refill passed per [place] [facility] protocol.     Requested Prescriptions   Pending Prescriptions Disp Refills    ROSUVASTATIN 10 MG Oral Tab [Pharmacy Med Name: ROSUVASTATIN 10MG TABLETS] 90 tablet 1     Sig: TAKE 1 TABLET(10 MG) BY MOUTH EVERY NIGHT       Cholesterol Medication Protocol Passed - 6/11/2023  9:23 PM        Passed - ALT in past 12 months        Passed - LDL in past 12 months        Passed - Last ALT < 80     Lab Results   Component Value Date    ALT 43 03/13/2023             Passed - Last LDL < 130     Lab Results   Component Value Date    LDL 82 03/13/2023               Passed - In person appointment or virtual visit in the past 12 mos or appointment in next 3 mos     Recent Outpatient Visits              2 months ago Type 2 diabetes mellitus without complication, without long-term current use of insulin ([facility] [address]    Doreen Gaviria MD    Office Visit    5 months ago Acute bacterial conjunctivitis of left eye    [facility] Stephen, Symone Duvall MD    Office Visit    6 months ago Physical exam    Williams Baker MD    Office Visit    11 months ago Neoplasm of soft tissue of buttock    [facility] Stephen, Symone Salguero MD    Office Visit    1 year ago Routine general medical examination at a health care facility    [address], Gilbert Griffith MD    Office Visit          Future Appointments         Provider Department Appt Notes    In 2 weeks Aurelia Kawasaki, MD [address], [address], [place] Colon Ca Screening - pt declined TCS 3/15/2023 jmb                  Future Appointments         Provider Department Appt Notes    In 2 weeks Aurelia Kawasaki, MD [address], [address], [place] Colon Ca Screening - pt declined TCS 3/15/2023 jmb          Recent Outpatient Visits              2 months ago Type 2 diabetes mellitus without complication, without long-term current use of insulin (HCC)    Carlos Shaw MD    Office Visit    5 months ago Acute bacterial conjunctivitis of left eye    Symone Shaw MD    Office Visit    6 months ago Physical exam    Lloyd Tolbert MD    Office Visit    11 months ago Neoplasm of soft tissue of buttock    Symone Shaw MD    Office Visit    1 year ago Routine general medical examination at a health care facility    [facility], Dale Mckeon MD    Office Visit

## 2023-08-30 RX ORDER — METFORMIN HYDROCHLORIDE 500 MG/1
500 TABLET, EXTENDED RELEASE ORAL 2 TIMES DAILY WITH MEALS
Qty: 180 TABLET | Refills: 3 | OUTPATIENT
Start: 2023-08-30

## 2023-09-12 ENCOUNTER — TELEPHONE (OUTPATIENT)
Facility: CLINIC | Age: 53
End: 2023-09-12

## 2023-09-12 ENCOUNTER — OFFICE VISIT (OUTPATIENT)
Facility: CLINIC | Age: 53
End: 2023-09-12

## 2023-09-12 VITALS
SYSTOLIC BLOOD PRESSURE: 146 MMHG | HEIGHT: 68 IN | BODY MASS INDEX: 28.79 KG/M2 | HEART RATE: 74 BPM | WEIGHT: 190 LBS | DIASTOLIC BLOOD PRESSURE: 87 MMHG

## 2023-09-12 DIAGNOSIS — Z12.11 COLON CANCER SCREENING: Primary | ICD-10-CM

## 2023-09-12 DIAGNOSIS — Z12.11 SCREENING FOR COLON CANCER: Primary | ICD-10-CM

## 2023-09-12 PROCEDURE — 3008F BODY MASS INDEX DOCD: CPT | Performed by: STUDENT IN AN ORGANIZED HEALTH CARE EDUCATION/TRAINING PROGRAM

## 2023-09-12 PROCEDURE — 3079F DIAST BP 80-89 MM HG: CPT | Performed by: STUDENT IN AN ORGANIZED HEALTH CARE EDUCATION/TRAINING PROGRAM

## 2023-09-12 PROCEDURE — 3077F SYST BP >= 140 MM HG: CPT | Performed by: STUDENT IN AN ORGANIZED HEALTH CARE EDUCATION/TRAINING PROGRAM

## 2023-09-12 PROCEDURE — S0285 CNSLT BEFORE SCREEN COLONOSC: HCPCS | Performed by: STUDENT IN AN ORGANIZED HEALTH CARE EDUCATION/TRAINING PROGRAM

## 2023-09-12 RX ORDER — SODIUM, POTASSIUM,MAG SULFATES 17.5-3.13G
SOLUTION, RECONSTITUTED, ORAL ORAL
Qty: 1 EACH | Refills: 0 | Status: SHIPPED | OUTPATIENT
Start: 2023-09-12

## 2023-09-18 RX ORDER — AMLODIPINE BESYLATE 5 MG/1
5 TABLET ORAL DAILY
Qty: 90 TABLET | Refills: 0 | Status: SHIPPED | OUTPATIENT
Start: 2023-09-18

## 2023-09-21 PROBLEM — Z12.11 SCREENING FOR COLON CANCER: Status: ACTIVE | Noted: 2023-09-21

## 2023-09-21 PROCEDURE — 88305 TISSUE EXAM BY PATHOLOGIST: CPT | Performed by: STUDENT IN AN ORGANIZED HEALTH CARE EDUCATION/TRAINING PROGRAM

## 2023-09-24 ENCOUNTER — TELEPHONE (OUTPATIENT)
Facility: CLINIC | Age: 53
End: 2023-09-24

## 2023-09-24 NOTE — TELEPHONE ENCOUNTER
Recall colon in 5 years per Dr Tamy Bertrand. Colon done 09/21/2023.     Health maintenance updated and message sent to pt outreach to repeat colonoscopy in 5 years

## 2023-09-24 NOTE — TELEPHONE ENCOUNTER
----- Message from Michelle Magallon MD sent at 9/22/2023 11:01 PM CDT -----  2 adenomas removed on recent colonoscopy, repeat in 5 years. Mychart sent to patient. GI Staff:    Can you please place recall for this patient to have a colonoscopy in 5 years. Thank you.     Michelle Magallon MD

## 2023-11-03 DIAGNOSIS — E11.9 TYPE 2 DIABETES MELLITUS WITHOUT COMPLICATION, WITHOUT LONG-TERM CURRENT USE OF INSULIN (HCC): ICD-10-CM

## 2023-11-05 RX ORDER — BLOOD SUGAR DIAGNOSTIC
STRIP MISCELLANEOUS
Qty: 200 STRIP | Refills: 5 | Status: SHIPPED | OUTPATIENT
Start: 2023-11-05

## 2023-11-05 NOTE — TELEPHONE ENCOUNTER
Refill passed per [facility] protocol   Requested Prescriptions   Pending Prescriptions Disp Refills    ONETOUCH ULTRA In Vitro Strip [place] Med Name: ONE TOUCH ULTRA BLUE TEST STR 25'S] 100 strip 0     Sig: USE ONCE DAILY       Diabetic Supplies Protocol Passed - 11/3/2023  2:55 PM        Passed - In person appointment or virtual visit in the past 12 mos or appointment in next 3 mos     Recent Outpatient Visits              1 month ago Colon cancer screening    [address] [facility], Allen Delgado MD    Office Visit    7 months ago Type 2 diabetes mellitus without complication, without long-term current use of insulin ([facility] [address]    Elizabeth Keller MD    Office Visit    10 months ago Acute bacterial conjunctivitis of left eye    Symone Keller MD    Office Visit    11 months ago Physical exam    Jarod Scott MD    Office Visit    1 year ago Neoplasm of soft tissue of buttock    Symone Keller MD    Office Visit

## 2024-02-01 ENCOUNTER — LAB ENCOUNTER (OUTPATIENT)
Dept: LAB | Age: 54
End: 2024-02-01
Attending: INTERNAL MEDICINE
Payer: COMMERCIAL

## 2024-02-01 DIAGNOSIS — E11.9 TYPE 2 DIABETES MELLITUS WITHOUT COMPLICATION, WITHOUT LONG-TERM CURRENT USE OF INSULIN (HCC): ICD-10-CM

## 2024-02-01 DIAGNOSIS — E11.9 DIABETES MELLITUS (HCC): Primary | ICD-10-CM

## 2024-02-01 LAB
ALT SERPL-CCNC: 39 U/L
ANION GAP SERPL CALC-SCNC: 8 MMOL/L (ref 0–18)
AST SERPL-CCNC: 30 U/L (ref ?–34)
BUN BLD-MCNC: 14 MG/DL (ref 9–23)
BUN/CREAT SERPL: 12.7 (ref 10–20)
CALCIUM BLD-MCNC: 9.5 MG/DL (ref 8.7–10.4)
CHLORIDE SERPL-SCNC: 105 MMOL/L (ref 98–112)
CHOLEST SERPL-MCNC: 208 MG/DL (ref ?–200)
CO2 SERPL-SCNC: 26 MMOL/L (ref 21–32)
CREAT BLD-MCNC: 1.1 MG/DL
EGFRCR SERPLBLD CKD-EPI 2021: 81 ML/MIN/1.73M2 (ref 60–?)
EST. AVERAGE GLUCOSE BLD GHB EST-MCNC: 148 MG/DL (ref 68–126)
FASTING PATIENT LIPID ANSWER: YES
FASTING STATUS PATIENT QL REPORTED: YES
GLUCOSE BLD-MCNC: 214 MG/DL (ref 70–99)
HBA1C MFR BLD: 6.8 % (ref ?–5.7)
HDLC SERPL-MCNC: 50 MG/DL (ref 40–59)
LDLC SERPL CALC-MCNC: 70 MG/DL (ref ?–100)
NONHDLC SERPL-MCNC: 158 MG/DL (ref ?–130)
OSMOLALITY SERPL CALC.SUM OF ELEC: 295 MOSM/KG (ref 275–295)
POTASSIUM SERPL-SCNC: 4.4 MMOL/L (ref 3.5–5.1)
SODIUM SERPL-SCNC: 139 MMOL/L (ref 136–145)
TRIGL SERPL-MCNC: 569 MG/DL (ref 30–149)
VLDLC SERPL CALC-MCNC: 88 MG/DL (ref 0–30)

## 2024-02-01 PROCEDURE — 84460 ALANINE AMINO (ALT) (SGPT): CPT

## 2024-02-01 PROCEDURE — 83036 HEMOGLOBIN GLYCOSYLATED A1C: CPT | Performed by: INTERNAL MEDICINE

## 2024-02-01 PROCEDURE — 3044F HG A1C LEVEL LT 7.0%: CPT | Performed by: INTERNAL MEDICINE

## 2024-02-01 PROCEDURE — 80061 LIPID PANEL: CPT

## 2024-02-01 PROCEDURE — 80048 BASIC METABOLIC PNL TOTAL CA: CPT | Performed by: INTERNAL MEDICINE

## 2024-02-01 PROCEDURE — 84450 TRANSFERASE (AST) (SGOT): CPT

## 2024-02-01 PROCEDURE — 36415 COLL VENOUS BLD VENIPUNCTURE: CPT | Performed by: INTERNAL MEDICINE

## 2024-02-02 ENCOUNTER — OFFICE VISIT (OUTPATIENT)
Dept: INTERNAL MEDICINE CLINIC | Facility: CLINIC | Age: 54
End: 2024-02-02
Payer: COMMERCIAL

## 2024-02-02 VITALS
DIASTOLIC BLOOD PRESSURE: 83 MMHG | WEIGHT: 190 LBS | HEART RATE: 77 BPM | BODY MASS INDEX: 28.79 KG/M2 | HEIGHT: 68 IN | SYSTOLIC BLOOD PRESSURE: 148 MMHG

## 2024-02-02 DIAGNOSIS — E11.9 TYPE 2 DIABETES MELLITUS WITHOUT COMPLICATION, WITHOUT LONG-TERM CURRENT USE OF INSULIN (HCC): ICD-10-CM

## 2024-02-02 DIAGNOSIS — I10 ESSENTIAL HYPERTENSION: ICD-10-CM

## 2024-02-02 DIAGNOSIS — Z12.5 SCREENING FOR PROSTATE CANCER: ICD-10-CM

## 2024-02-02 DIAGNOSIS — Z00.00 PHYSICAL EXAM: Primary | ICD-10-CM

## 2024-02-02 DIAGNOSIS — E78.5 HYPERLIPIDEMIA, UNSPECIFIED HYPERLIPIDEMIA TYPE: ICD-10-CM

## 2024-02-02 PROCEDURE — 3008F BODY MASS INDEX DOCD: CPT | Performed by: INTERNAL MEDICINE

## 2024-02-02 PROCEDURE — 99396 PREV VISIT EST AGE 40-64: CPT | Performed by: INTERNAL MEDICINE

## 2024-02-02 PROCEDURE — 3079F DIAST BP 80-89 MM HG: CPT | Performed by: INTERNAL MEDICINE

## 2024-02-02 PROCEDURE — 3077F SYST BP >= 140 MM HG: CPT | Performed by: INTERNAL MEDICINE

## 2024-02-02 RX ORDER — ATORVASTATIN CALCIUM 10 MG/1
10 TABLET, FILM COATED ORAL NIGHTLY
Qty: 30 TABLET | Refills: 2 | Status: SHIPPED | OUTPATIENT
Start: 2024-02-02

## 2024-02-02 NOTE — PROGRESS NOTES
HPI:    Patient ID: Carlos Tarango is a 53 year old male.    HPI    Physical exam  Generally healthy    Diabetes  pt is complaint with diet  and  exercise    /83 (BP Location: Right arm, Patient Position: Sitting, Cuff Size: adult)   Pulse 77   Ht 5' 8\" (1.727 m)   Wt 190 lb (86.2 kg)   BMI 28.89 kg/m²   Wt Readings from Last 6 Encounters:   02/02/24 190 lb (86.2 kg)   09/14/23 188 lb (85.3 kg)   09/12/23 190 lb (86.2 kg)   03/16/23 195 lb (88.5 kg)   01/04/23 192 lb (87.1 kg)   12/22/22 195 lb (88.5 kg)     Body mass index is 28.89 kg/m².  HGBA1C:    Lab Results   Component Value Date    A1C 6.8 (H) 02/01/2024    A1C 6.1 (H) 03/13/2023    A1C 7.8 (H) 11/14/2022     (H) 02/01/2024         Review of Systems   Constitutional:  Negative for activity change, chills, fatigue and fever.   HENT:  Negative for ear discharge, nosebleeds, postnasal drip, rhinorrhea, sinus pressure and sore throat.    Eyes:  Negative for pain, discharge and redness.   Respiratory:  Negative for cough, chest tightness, shortness of breath and wheezing.    Cardiovascular:  Negative for chest pain, palpitations and leg swelling.   Gastrointestinal:  Negative for abdominal pain, blood in stool, constipation, diarrhea, nausea and vomiting.   Genitourinary:  Negative for difficulty urinating, dysuria, frequency, hematuria and urgency.   Musculoskeletal:  Negative for back pain, gait problem and joint swelling.   Skin:  Negative for rash.   Neurological:  Negative for syncope, weakness, light-headedness and headaches.   Psychiatric/Behavioral:  Negative for dysphoric mood. The patient is not nervous/anxious.          Current Outpatient Medications   Medication Sig Dispense Refill    Glucose Blood (ONETOUCH ULTRA) In Vitro Strip 1 each by Other route 2 (two) times a day. 200 strip 5    aspirin 81 MG Oral Tab EC Take 1 tablet (81 mg total) by mouth daily.      amLODIPine 5 MG Oral Tab Take 1 tablet (5 mg total) by mouth daily.  90 tablet 0    FISH OIL-KRILL OIL OR Take by mouth.      losartan 100 MG Oral Tab Take 1 tablet (100 mg total) by mouth daily. 90 tablet 3    metFORMIN  MG Oral Tablet 24 Hr Take 1 tablet (500 mg total) by mouth 2 (two) times daily with meals. 180 tablet 3    Blood Glucose Monitoring Suppl (ONETOUCH ULTRA 2) w/Device Does not apply Kit 1 kit daily. 1 kit 0    OneTouch Delica Lancets 33G Does not apply Misc 100 lancet by Finger stick route daily. 100 each 3    Tazarotene (TAZORAC) 0.1 % External Gel Use at bedtime as directed 30 g 1    rosuvastatin 10 MG Oral Tab Take 1 tablet (10 mg total) by mouth nightly. (Patient not taking: Reported on 2/2/2024) 90 tablet 2     Allergies:  Allergies   Allergen Reactions    Seasonal OTHER (SEE COMMENTS)     Sneezing and dry throat.       HISTORY:  Past Medical History:   Diagnosis Date    Diabetes (HCC)     Diabetic acidosis, type I (HCC)     Esophageal reflux     High blood pressure     High cholesterol     Malaria       Past Surgical History:   Procedure Laterality Date    COLONOSCOPY N/A 9/21/2023    Procedure: COLONOSCOPY;  Surgeon: Ken Kamara MD;  Location: [facility] MAIN OR      Family History   Problem Relation Age of Onset    Diabetes Maternal Grandfather     Hypertension Father     Pulmonary Disease Father         COPD    Diabetes Mother     Hypertension Mother       Social History:   Social History     Socioeconomic History    Marital status:    Tobacco Use    Smoking status: Never     Passive exposure: Never    Smokeless tobacco: Never   Vaping Use    Vaping Use: Never used   Substance and Sexual Activity    Alcohol use: Yes     Alcohol/week: 2.0 standard drinks of alcohol     Types: 2 Standard drinks or equivalent per week     Comment: Daily    Drug use: Not Currently   Other Topics Concern    Caffeine Concern Yes     Comment: Coffee 1 cup daily        PHYSICAL EXAM:    Physical Exam  Constitutional:       Appearance: He is well-developed.   HENT:       Right Ear: Ear canal normal.      Left Ear: Ear canal normal.   Eyes:      Conjunctiva/sclera: Conjunctivae normal.      Pupils: Pupils are equal, round, and reactive to light.   Cardiovascular:      Rate and Rhythm: Normal rate and regular rhythm.      Heart sounds: Normal heart sounds.   Pulmonary:      Effort: Pulmonary effort is normal.      Breath sounds: Normal breath sounds.   Abdominal:      General: Bowel sounds are normal.      Palpations: Abdomen is soft.   Skin:     General: Skin is warm and dry.   Neurological:      Mental Status: He is alert and oriented to person, place, and time.      Deep Tendon Reflexes: Reflexes are normal and symmetric.       Component      Latest Ref Rng 2/1/2024   Glucose      70 - 99 mg/dL 214 (H)    Sodium      136 - 145 mmol/L 139    Potassium      3.5 - 5.1 mmol/L 4.4    Chloride      98 - 112 mmol/L 105    Carbon Dioxide, Total      21.0 - 32.0 mmol/L 26.0    ANION GAP      0 - 18 mmol/L 8    BUN      9 - 23 mg/dL 14    CREATININE      0.70 - 1.30 mg/dL 1.10    BUN/CREATININE RATIO      10.0 - 20.0  12.7    CALCIUM      8.7 - 10.4 mg/dL 9.5    CALCULATED OSMOLALITY      275 - 295 mOsm/kg 295    EGFR      >=60 mL/min/1.73m2 81    Patient Fasting for BMP? Yes    Cholesterol, Total      <200 mg/dL 208 (H)    HDL Cholesterol      40 - 59 mg/dL 50    Triglycerides      30 - 149 mg/dL 569 (H)    LDL Cholesterol Calc      <100 mg/dL 70    VLDL      0 - 30 mg/dL 88 (H)    NON-HDL CHOLESTEROL      <130 mg/dL 158 (H)       Component      Latest Ref Rng 2/1/2024   HEMOGLOBIN A1c      <5.7 % 6.8 (H)    ESTIMATED AVERAGE GLUCOSE      68 - 126 mg/dL 148 (H)    POC GLUCOSE    AST (SGOT)      <=34 U/L 30    ALT (SGPT)      10 - 49 U/L 39       Legend:  (H) High  Legend:  (H) High         ASSESSMENT/PLAN:   (Z00.00) Physical exam  (primary encounter diagnosis)  Plan: generally healthy  Prostate ca screen and colonoscopy routine advised    (E11.9) Type 2 diabetes mellitus without  complication, without long-term current use of insulin (HCC)  Plan: Microalb/Creat Ratio, Random Urine, Basic         Metabolic Panel (8), Hemoglobin A1C,         OPHTHALMOLOGY - INTERNAL        HGBA1C:    Lab Results   Component Value Date    A1C 6.8 (H) 02/01/2024    A1C 6.1 (H) 03/13/2023    A1C 7.8 (H) 11/14/2022     (H) 02/01/2024     Controlled monitor  Eye exam yearly    (I10) Essential hypertension  Plan: /83 (BP Location: Right arm, Patient Position: Sitting, Cuff Size: adult)   Pulse 77   Ht 5' 8\" (1.727 m)   Wt 190 lb (86.2 kg)   BMI 28.89 kg/m²   Low salt diet advised  Monitor blood pressure daily and record  Follow up in a month      (E78.5) Hyperlipidemia, unspecified hyperlipidemia type  Plan: ALT+AST, Lipid Panel, TSH and Free T4 [E]        Low cholesterol diet advised  Avoid saturated and trans fats       (Z12.5) Screening for prostate cancer  Plan: PSA Total, Screen        Asymptomatic  monitor        Meds This Visit:  Requested Prescriptions      No prescriptions requested or ordered in this encounter       Imaging & Referrals:  None        ID#[id number]

## 2024-02-04 DIAGNOSIS — E11.9 TYPE 2 DIABETES MELLITUS WITHOUT COMPLICATION, WITHOUT LONG-TERM CURRENT USE OF INSULIN (HCC): ICD-10-CM

## 2024-02-05 RX ORDER — LANCETS 33 GAUGE
1 EACH MISCELLANEOUS DAILY
Qty: 100 EACH | Refills: 3 | Status: SHIPPED | OUTPATIENT
Start: 2024-02-05

## 2024-02-06 NOTE — TELEPHONE ENCOUNTER
Refill passed per [facility] protocol.     Requested Prescriptions   Pending Prescriptions Disp Refills    ONETOUCH DELICA PLUS [id number] Does not apply Misc [Pharmacy Med Name: ONE TOUCH DELICA PLUS 33G LANCETS] 100 each 3     Sig: USE DAILY       Diabetic Supplies Protocol Passed - 2/4/2024  7:27 AM        Passed - In person appointment or virtual visit in the past 12 mos or appointment in next 3 mos     Recent Outpatient Visits              [facility] ago [facility]    [facility]    4 months ago [facility]    Office Visit    10 months ago Type 2 diabetes mellitus without complication, without long-term current use of insulin ([facility])    [facility]    Office Visit    1 year ago Acute bacterial conjunctivitis of left eye    [facility]    Office Visit    1 year ago Physical exam    [facility], Zechariah Briseno MD    Office Visit                                 Recent Outpatient Visits              3 days ago Physical exam    [facility]    Office Visit    4 months ago [facility] cancer screening    [facility]    Office Visit    10 months ago Type 2 diabetes mellitus without complication, without long-term current use of insulin ([facility])    [facility]    Office Visit    1 year ago Acute bacterial conjunctivitis of left eye    [facility]    Office Visit    1 year ago Physical exam    [facility],  Zechariah Briseno MD    Office Visit

## 2024-05-06 RX ORDER — ATORVASTATIN CALCIUM 10 MG/1
10 TABLET, FILM COATED ORAL NIGHTLY
Qty: 90 TABLET | Refills: 3 | Status: SHIPPED | OUTPATIENT
Start: 2024-05-06

## 2024-05-07 NOTE — TELEPHONE ENCOUNTER
DR Klein's office visit note 2/2/24;  (I10) Essential hypertension  Plan: /83 (BP Location: Right arm, Patient Position: Sitting, Cuff Size: adult)   Pulse 77   Ht 5' 8\" (1.727 m)   Wt 190 lb (86.2 kg)   BMI 28.89 kg/m²   Low salt diet advised  Monitor blood pressure daily and record  Follow up in a month    No future appointments.    ip.access message sent .     Patient =please call and assists           Refill passed per [facility] protocol.     Requested Prescriptions   Pending Prescriptions Disp Refills    ATORVASTATIN 10 MG Oral Tab [Pharmacy Med Name: ATORVASTATIN 10MG TABLETS] 30 tablet 2     Sig: TAKE 1 TABLET(10 MG) BY MOUTH EVERY NIGHT       Cholesterol Medication Protocol Passed - 5/4/2024  2:28 PM        Passed - ALT < 80     Lab Results   Component Value Date    ALT 39 02/01/2024             Passed - ALT resulted within past year        Passed - Lipid panel within past 12 months     Lab Results   Component Value Date    CHOLEST 208 (H) 02/01/2024    TRIG 569 (H) 02/01/2024    HDL 50 02/01/2024    LDL 70 02/01/2024    VLDL 88 (H) 02/01/2024    NONHDLC 158 (H) 02/01/2024             Passed - In person appointment or virtual visit in the past 12 mos or appointment in next 3 mos     Recent Outpatient Visits              3 months ago [facility]    [facility]    7 months ago [facility]    Office Visit    1 year ago Type 2 diabetes mellitus without complication, without long-term current use of insulin (HCC)    [facility]    Office Visit    1 year ago Acute bacterial conjunctivitis of left eye    [facility]    Office Visit    1 year ago Physical exam    [place]  [facility] [place], Zechariah Briseno MD    Office Visit                                 Recent Outpatient Visits              3 months ago Physical exam    [facility], Zechariah Salinas MD    Office Visit    7 months ago Colon cancer screening    [facility], Ken Anderson MD    Office Visit    1 year ago Type 2 diabetes mellitus without complication, without long-term current use of insulin (HCC)    [facility], Zechariah Salinas MD    Office Visit    1 year ago Acute bacterial conjunctivitis of left eye    [facility], Hong Azar MD    Office Visit    1 year ago Physical exam    [facility], Zechariah Briseno MD    Office Visit

## 2024-05-08 NOTE — TELEPHONE ENCOUNTER
Patient scheduled June 3rd; states he will fast per below notes and complete labs ahead of appointment

## 2024-05-23 ENCOUNTER — LAB ENCOUNTER (OUTPATIENT)
Dept: LAB | Age: 54
End: 2024-05-23
Attending: INTERNAL MEDICINE

## 2024-05-23 DIAGNOSIS — E11.9 TYPE 2 DIABETES MELLITUS WITHOUT COMPLICATION, WITHOUT LONG-TERM CURRENT USE OF INSULIN (HCC): ICD-10-CM

## 2024-05-23 DIAGNOSIS — Z12.5 SCREENING FOR PROSTATE CANCER: ICD-10-CM

## 2024-05-23 DIAGNOSIS — E78.5 HYPERLIPIDEMIA, UNSPECIFIED HYPERLIPIDEMIA TYPE: Primary | ICD-10-CM

## 2024-05-23 LAB
ALT SERPL-CCNC: 31 U/L
ANION GAP SERPL CALC-SCNC: 9 MMOL/L (ref 0–18)
AST SERPL-CCNC: 26 U/L (ref ?–34)
BUN BLD-MCNC: 16 MG/DL (ref 9–23)
BUN/CREAT SERPL: 14.4 (ref 10–20)
CALCIUM BLD-MCNC: 9.6 MG/DL (ref 8.7–10.4)
CHLORIDE SERPL-SCNC: 105 MMOL/L (ref 98–112)
CHOLEST SERPL-MCNC: 150 MG/DL (ref ?–200)
CO2 SERPL-SCNC: 26 MMOL/L (ref 21–32)
COMPLEXED PSA SERPL-MCNC: 0.57 NG/ML (ref ?–4)
CREAT BLD-MCNC: 1.11 MG/DL
CREAT UR-SCNC: 264.4 MG/DL
EGFRCR SERPLBLD CKD-EPI 2021: 80 ML/MIN/1.73M2 (ref 60–?)
EST. AVERAGE GLUCOSE BLD GHB EST-MCNC: 148 MG/DL (ref 68–126)
FASTING PATIENT LIPID ANSWER: YES
FASTING STATUS PATIENT QL REPORTED: YES
GLUCOSE BLD-MCNC: 189 MG/DL (ref 70–99)
HBA1C MFR BLD: 6.8 % (ref ?–5.7)
HDLC SERPL-MCNC: 68 MG/DL (ref 40–59)
LDLC SERPL CALC-MCNC: 52 MG/DL (ref ?–100)
MICROALBUMIN UR-MCNC: 3.6 MG/DL
MICROALBUMIN/CREAT 24H UR-RTO: 13.6 UG/MG (ref ?–30)
NONHDLC SERPL-MCNC: 82 MG/DL (ref ?–130)
OSMOLALITY SERPL CALC.SUM OF ELEC: 296 MOSM/KG (ref 275–295)
POTASSIUM SERPL-SCNC: 4.3 MMOL/L (ref 3.5–5.1)
SODIUM SERPL-SCNC: 140 MMOL/L (ref 136–145)
T4 FREE SERPL-MCNC: 1.1 NG/DL (ref 0.8–1.7)
TRIGL SERPL-MCNC: 183 MG/DL (ref 30–149)
TSI SER-ACNC: 2.89 MIU/ML (ref 0.55–4.78)
VLDLC SERPL CALC-MCNC: 26 MG/DL (ref 0–30)

## 2024-05-23 PROCEDURE — 80061 LIPID PANEL: CPT

## 2024-05-23 PROCEDURE — 84460 ALANINE AMINO (ALT) (SGPT): CPT

## 2024-05-23 PROCEDURE — 84443 ASSAY THYROID STIM HORMONE: CPT

## 2024-05-23 PROCEDURE — 84450 TRANSFERASE (AST) (SGOT): CPT

## 2024-05-23 PROCEDURE — 82570 ASSAY OF URINE CREATININE: CPT

## 2024-05-23 PROCEDURE — 84439 ASSAY OF FREE THYROXINE: CPT

## 2024-05-23 PROCEDURE — 80048 BASIC METABOLIC PNL TOTAL CA: CPT | Performed by: INTERNAL MEDICINE

## 2024-05-23 PROCEDURE — 36415 COLL VENOUS BLD VENIPUNCTURE: CPT | Performed by: INTERNAL MEDICINE

## 2024-05-23 PROCEDURE — 83036 HEMOGLOBIN GLYCOSYLATED A1C: CPT | Performed by: INTERNAL MEDICINE

## 2024-05-23 PROCEDURE — 82043 UR ALBUMIN QUANTITATIVE: CPT

## 2024-05-28 NOTE — TELEPHONE ENCOUNTER
Please Review. Protocol Failed; No Protocol   BP Readings from Last 1 Encounters:   02/02/24 148/83   Recent Visits  Date Type Provider Dept   02/02/24 Office Visit Zechariah Klein MD [facility]   03/16/23 Office Visit Zechariah Klein MD [facility]   01/04/23 Office Visit Hong Oneill MD [facility]   Showing recent visits within past 540 days with a meds authorizing provider and meeting all other requirements  Future Appointments  Date Type Provider Dept   06/17/24 Appointment Zechariah Klein MD [facility]   Showing future appointments within next 150 days with a meds authorizing provider and meeting all other requirements    Requested Prescriptions   Pending Prescriptions Disp Refills    LOSARTAN 100 MG Oral Tab [Pharmacy Med Name: LOSARTAN 100MG TABLETS] 90 tablet 3     Sig: TAKE 1 TABLET(100 MG) BY MOUTH DAILY       Hypertension Medications Protocol Failed - 5/23/2024 11:53 AM        Failed - Last BP reading less than 140/90     BP Readings from Last 1 Encounters:   02/02/24 148/83               Passed - CMP or BMP in past 12 months        Passed - In person appointment or virtual visit in the past 12 mos or appointment in next 3 mos     Recent Outpatient Visits              3 months ago [facility]    [facility] Visit    8 months ago [facility]    Office Visit    1 year ago Type 2 diabetes mellitus without complication, without long-term current use of insulin (HCC)    [facility]    Office Visit    1 year ago Acute bacterial conjunctivitis of left eye    [facility]    Office Visit    1 year ago Physical exam    [facility], Zechariah Briseno MD     Office Visit          Future Appointments         Provider Department Appt Notes    In 2 weeks Zechariah Klein MD [facility] followup                    Passed - EGFRCR or GFRNAA > 50     GFR Evaluation  EGFRCR: 80 , resulted on 5/23/2024                 Future Appointments         Provider Department Appt Notes    In 2 weeks Zechariah Klein MD [facility] followup          Recent Outpatient Visits              3 months ago [facility]    Office Visit    8 months ago [facility] cancer screening    [facility]    Office Visit    1 year ago Type 2 diabetes mellitus without complication, without long-term current use of insulin (HCC)    [facility]    Office Visit    1 year ago Acute bacterial conjunctivitis of left eye    [facility]    Office Visit    1 year ago Physical exam    [facility] Zechar[facility] Klein MD    Office Visit

## 2024-05-29 RX ORDER — LOSARTAN POTASSIUM 100 MG/1
100 TABLET ORAL DAILY
Qty: 90 TABLET | Refills: 3 | Status: SHIPPED | OUTPATIENT
Start: 2024-05-29

## 2024-06-24 ENCOUNTER — OFFICE VISIT (OUTPATIENT)
Dept: INTERNAL MEDICINE CLINIC | Facility: CLINIC | Age: 54
End: 2024-06-24

## 2024-06-24 VITALS
DIASTOLIC BLOOD PRESSURE: 85 MMHG | HEART RATE: 64 BPM | RESPIRATION RATE: 16 BRPM | BODY MASS INDEX: 28.49 KG/M2 | HEIGHT: 68 IN | WEIGHT: 188 LBS | SYSTOLIC BLOOD PRESSURE: 146 MMHG

## 2024-06-24 DIAGNOSIS — I10 ESSENTIAL HYPERTENSION: Primary | ICD-10-CM

## 2024-06-24 DIAGNOSIS — E78.5 HYPERLIPIDEMIA, UNSPECIFIED HYPERLIPIDEMIA TYPE: ICD-10-CM

## 2024-06-24 DIAGNOSIS — E11.9 TYPE 2 DIABETES MELLITUS WITHOUT COMPLICATION, WITHOUT LONG-TERM CURRENT USE OF INSULIN (HCC): ICD-10-CM

## 2024-06-24 PROCEDURE — 3044F HG A1C LEVEL LT 7.0%: CPT | Performed by: NURSE PRACTITIONER

## 2024-06-24 PROCEDURE — 3061F NEG MICROALBUMINURIA REV: CPT | Performed by: NURSE PRACTITIONER

## 2024-06-24 PROCEDURE — 3008F BODY MASS INDEX DOCD: CPT | Performed by: NURSE PRACTITIONER

## 2024-06-24 PROCEDURE — 3079F DIAST BP 80-89 MM HG: CPT | Performed by: NURSE PRACTITIONER

## 2024-06-24 PROCEDURE — 99214 OFFICE O/P EST MOD 30 MIN: CPT | Performed by: NURSE PRACTITIONER

## 2024-06-24 PROCEDURE — 3077F SYST BP >= 140 MM HG: CPT | Performed by: NURSE PRACTITIONER

## 2024-06-24 RX ORDER — AMLODIPINE BESYLATE 5 MG/1
5 TABLET ORAL 2 TIMES DAILY
COMMUNITY
Start: 2024-06-24

## 2024-06-24 NOTE — PATIENT INSTRUCTIONS
Increase norvasc to 5mg twice a day     Monitor your blood pressure and record results     Send a [employer] [facility] message with your blood pressure readings.

## 2024-06-24 NOTE — PROGRESS NOTES
Carlos Tarango is a 54 year old male.  Chief Complaint   Patient presents with    Diabetes    Hypertension     HPI:   He presents for follow up. He has a history of HTN and diabetes. His BP is elevated in office. He does check his BP at home and it ranges 140-150.     He had recent blood work completed. His A1c was 6.8. He does check his blood sugars at home. His fasting blood sugar this morning was 172.      He sees Dr Johnston with for ophthalmology. He has an appointment in July.     Current Outpatient Medications   Medication Sig Dispense Refill    amLODIPine 5 MG Oral Tab Take 1 tablet (5 mg total) by mouth 2 (two) times daily.      losartan 100 MG Oral Tab Take 1 tablet (100 mg total) by mouth daily. 90 tablet 3    atorvastatin 10 MG Oral Tab Take 1 tablet (10 mg total) by mouth nightly. 90 tablet 3    Lancets (ONETOUCH DELICA PLUS [id number]) Does not apply Misc 1 Lancet by Finger stick route daily. 100 each 3    Glucose Blood (ONETOUCH ULTRA) In Vitro Strip 1 each by Other route 2 (two) times a day. 200 strip 5    aspirin 81 MG Oral Tab EC Take 1 tablet (81 mg total) by mouth daily.      FISH OIL-KRILL OIL OR Take by mouth.      metFORMIN  MG Oral Tablet 24 Hr Take 1 tablet (500 mg total) by mouth 2 (two) times daily with meals. 180 tablet 3    Blood Glucose Monitoring Suppl (ONETOUCH ULTRA 2) w/Device Does not apply Kit 1 kit daily. 1 kit 0      Past Medical History:    Diabetes (HCC)    Diabetic acidosis, type I (HCC)    Esophageal reflux    High blood pressure    High cholesterol    Malaria      Past Surgical History:   Procedure Laterality Date    Colonoscopy N/A 9/21/2023    Procedure: COLONOSCOPY;  Surgeon: Ken Kamara MD;  Location: [facility] MAIN OR      Social History:  Social History     Socioeconomic History    Marital status:    Tobacco Use    Smoking status: Never     Passive exposure: Never    Smokeless tobacco: Never   Vaping Use    Vaping status: Never Used   Substance and  Sexual Activity    Alcohol use: Yes     Alcohol/week: 2.0 standard drinks of alcohol     Types: 2 Standard drinks or equivalent per week     Comment: Daily    Drug use: Not Currently   Other Topics Concern    Caffeine Concern Yes     Comment: Coffee 1 cup daily      Family History   Problem Relation Age of Onset    Diabetes Maternal Grandfather     Hypertension Father     Pulmonary Disease Father         COPD    Diabetes Mother     Hypertension Mother       Allergies   Allergen Reactions    Seasonal OTHER (SEE COMMENTS)     Sneezing and dry throat.        REVIEW OF SYSTEMS:     Review of Systems   Constitutional:  Negative for fever.   HENT: Negative.     Respiratory:  Negative for cough, shortness of breath and wheezing.    Cardiovascular:  Negative for chest pain.   Gastrointestinal: Negative.    Genitourinary: Negative.    Musculoskeletal: Negative.    Skin: Negative.    Neurological: Negative.    Psychiatric/Behavioral: Negative.        Wt Readings from Last 5 Encounters:   06/24/24 188 lb (85.3 kg)   02/02/24 190 lb (86.2 kg)   09/14/23 188 lb (85.3 kg)   09/12/23 190 lb (86.2 kg)   03/16/23 195 lb (88.5 kg)     Body mass index is 28.59 kg/m².      EXAM:   /85   Pulse 64   Resp 16   Ht 5' 8\" (1.727 m)   Wt 188 lb (85.3 kg)   BMI 28.59 kg/m²     Physical Exam  Vitals reviewed.   Constitutional:       Appearance: Normal appearance.   HENT:      Head: Normocephalic.   Cardiovascular:      Rate and Rhythm: Normal rate and regular rhythm.      Pulses: Normal pulses.   Pulmonary:      Breath sounds: Normal breath sounds. No wheezing.   Musculoskeletal:         General: No swelling. Normal range of motion.   Skin:     General: Skin is warm and dry.   Neurological:      Mental Status: He is alert and oriented to person, place, and time.   Psychiatric:         Mood and Affect: Mood normal.         Behavior: Behavior normal.          Bilateral barefoot skin diabetic exam is normal, visualized feet and the  appearance is normal.  Bilateral monofilament/sensation of both feet is normal.  Pulsation pedal pulse exam of both lower legs/feet is normal as well.       Component      Latest Ref Rng 5/23/2024   Glucose      70 - 99 mg/dL 189 (H)    Sodium      136 - 145 mmol/L 140    Potassium      3.5 - 5.1 mmol/L 4.3    Chloride      98 - 112 mmol/L 105    Carbon Dioxide, Total      21.0 - 32.0 mmol/L 26.0    ANION GAP      0 - 18 mmol/L 9    BUN      9 - 23 mg/dL 16    CREATININE      0.70 - 1.30 mg/dL 1.11    BUN/CREATININE RATIO      10.0 - 20.0  14.4    CALCIUM      8.7 - 10.4 mg/dL 9.6    CALCULATED OSMOLALITY      275 - 295 mOsm/kg 296 (H)    EGFR      >=60 mL/min/1.73m2 80    Patient Fasting for BMP? Yes    Cholesterol, Total      <200 mg/dL 150    HDL Cholesterol      40 - 59 mg/dL 68 (H)    Triglycerides      30 - 149 mg/dL 183 (H)    LDL Cholesterol Calc      <100 mg/dL 52    VLDL      0 - 30 mg/dL 26    NON-HDL CHOLESTEROL      <130 mg/dL 82    Patient Fasting for Lipid? Yes    MALB URINE      mg/dL 3.60    CREATININE UR RANDOM      mg/dL 264.40    MALB/CRE CALC      <=30.0 ug/mg 13.6    HEMOGLOBIN A1c      <5.7 % 6.8 (H)    ESTIMATED AVERAGE GLUCOSE      68 - 126 mg/dL 148 (H)    T4,Free (Direct)      0.8 - 1.7 ng/dL 1.1    TSH      0.550 - 4.780 mIU/mL 2.887    AST (SGOT)      <=34 U/L 26    ALT (SGPT)      10 - 49 U/L 31       Legend:  (H) High  ASSESSMENT AND PLAN:   1. Essential hypertension  - BP elevated in office  - increase Norvasc to 10mg daily   - patient to monitor BP at home and record results   - send a [employer] message with BP results in 2 weeks     2. Type 2 diabetes mellitus without complication, without long-term current use of insulin (HCC)  - A1c 6.8 stable  - CPM  - foot exam completed see above     3. Hyperlipidemia, unspecified hyperlipidemia type  - continue to monitor diet and exercise as tolerated.   - continue Lipitor 10mg daily      The patient indicates understanding of these issues and  agrees to the plan.  Return in about 2 weeks (around 7/8/2024).

## 2024-07-01 RX ORDER — AMLODIPINE BESYLATE 5 MG/1
5 TABLET ORAL DAILY
Qty: 90 TABLET | Refills: 3 | Status: SHIPPED | OUTPATIENT
Start: 2024-07-01

## 2024-07-01 NOTE — TELEPHONE ENCOUNTER
Please Review. Protocol Failed; No Protocol   BP Readings from Last 1 Encounters:   06/24/24 146/85     Recent Visits  Date Type Provider Dept   06/24/24 Office Visit Micheline Lemus APRN [facility]   02/02/24 Office Visit Zechariah Klein MD [facility]   03/16/23 Office Visit Zechariah Klein MD [facility]       Requested Prescriptions   Pending Prescriptions Disp Refills    AMLODIPINE 5 MG Oral Tab [Pharmacy Med Name: AMLODIPINE BESYLATE 5MG TABLETS] 90 tablet 0     Sig: TAKE 1 TABLET(5 MG) BY MOUTH DAILY       Hypertension Medications Protocol Failed - 6/27/2024  7:43 AM        Failed - Last BP reading less than 140/90     BP Readings from Last 1 Encounters:   06/24/24 146/85               Passed - CMP or BMP in past 12 months        Passed - In person appointment or virtual visit in the past 12 mos or appointment in next 3 mos     Recent Outpatient Visits              1 week ago Essential hypertension    [facility]    Office Visit    5 months ago [facility]    [facility] Visit    9 months ago [facility]    Office Visit    1 year ago Type 2 diabetes mellitus without complication, without long-term current use of insulin (HCC)    [facility]    Office Visit    1 year ago Acute bacterial conjunctivitis of left eye    [facility]    Office Visit                      Passed - EGFRCR or GFRNAA > 50     GFR Evaluation  EGFRCR: 80 , resulted on 5/23/2024                   Recent Outpatient Visits              1 week ago Essential hypertension    [facility]     Office Visit    5 months ago Physical exam    [facility], Zechariah Salinas MD    Office Visit    9 months ago Colon cancer screening    [facility], Ken Anderson MD    Office Visit    1 year ago Type 2 diabetes mellitus without complication, without long-term current use of insulin (HCC)    [facility], Zechariah Salinas MD    Office Visit    1 year ago Acute bacterial conjunctivitis of left eye    [facility], Hong Azar MD    Office Visit

## 2024-08-21 RX ORDER — METFORMIN HYDROCHLORIDE 500 MG/1
500 TABLET, EXTENDED RELEASE ORAL 2 TIMES DAILY WITH MEALS
Qty: 180 TABLET | Refills: 3 | Status: SHIPPED | OUTPATIENT
Start: 2024-08-21

## 2024-08-21 NOTE — TELEPHONE ENCOUNTER
Refill passed per [facility] protocol.     Requested Prescriptions   Pending Prescriptions Disp Refills    METFORMIN  MG Oral Tablet 24 Hr [Pharmacy Med Name: METFORMIN ER 500MG 24HR TABS] 180 tablet 3     Sig: TAKE 1 TABLET(500 MG) BY MOUTH TWICE DAILY WITH MEALS       Diabetes Medication Protocol Passed - 8/21/2024  3:25 AM        Passed - Last A1C < 7.5 and within past 6 months     Lab Results   Component Value Date    A1C 6.8 (H) 05/23/2024             Passed - In person appointment or virtual visit in the past 6 mos or appointment in next 3 mos     Recent Outpatient Visits              1 month ago Essential hypertension    [facility] Quijano APRN    Office Visit    6 months ago [facility] exam    [facility]    Office Visit    11 months ago Colon cancer screening    [facility]    Office Visit    1 year ago Type 2 diabetes mellitus without complication, without long-term current use of insulin ([facility])    [facility]    Office Visit    1 year ago Acute bacterial conjunctivitis of left eye    [facility]    Office Visit                      Passed - Microalbumin procedure in past 12 months or taking ACE/ARB        Passed - EGFRCR or GFRNAA > 50     GFR Evaluation  EGFRCR: 80 , resulted on 5/23/2024          Passed - GFR in the past 12 months             @[facility]FVPRINTGRP@      @[facility]@

## 2024-11-04 DIAGNOSIS — E11.9 TYPE 2 DIABETES MELLITUS WITHOUT COMPLICATION, WITHOUT LONG-TERM CURRENT USE OF INSULIN (HCC): ICD-10-CM

## 2024-11-08 RX ORDER — BLOOD SUGAR DIAGNOSTIC
1 STRIP MISCELLANEOUS 2 TIMES DAILY
Qty: 200 STRIP | Refills: 3 | Status: SHIPPED | OUTPATIENT
Start: 2024-11-08

## 2024-11-08 RX ORDER — LANCETS 33 GAUGE
1 EACH MISCELLANEOUS 2 TIMES DAILY
Qty: 200 EACH | Refills: 3 | Status: SHIPPED | OUTPATIENT
Start: 2024-11-08

## 2024-11-08 RX ORDER — LANCETS 33 GAUGE
1 EACH MISCELLANEOUS DAILY
Qty: 100 EACH | Refills: 3 | OUTPATIENT
Start: 2024-11-08

## 2024-11-08 NOTE — TELEPHONE ENCOUNTER
Refill passed per [facility] protocol.  Requested Prescriptions   Pending Prescriptions Disp Refills    Glucose Blood (ONETOUCH ULTRA) In Vitro Strip 200 strip 3     Si each by In Vitro route 2 (two) times daily. 1 each by Other route 2 (two) times a day.       Diabetic Supplies Protocol Passed - 2024 10:33 AM        Passed - In person appointment or virtual visit in the past 12 mos or appointment in next 3 mos     Recent Outpatient Visits              4 months ago Essential [facility]    [facility] Visit    9 months ago [facility] exam    [facility]    [facility]    1 year ago Colon cancer screening    [facility] Anderson MD    Office Visit    1 year ago Type 2 diabetes mellitus without complication, without long-term current use of insulin ([facility])    [facility]    [facility] Visit    1 year ago Acute bacterial conjunctivitis of left eye    [facility]    [facility]                        Lancets (ONETOUCH DELICA PLUS [id number]) Does not apply Misc 200 each 3     Si Lancet by Finger stick route 2 (two) times daily.       Diabetic Supplies Protocol Passed - 2024 10:33 AM        Passed - In person appointment or virtual visit in the past 12 mos or appointment in next 3 mos     Recent Outpatient Visits              4 months ago Essential hypertension    [facility]    Office Visit    9 months ago Physical exam    [facility]    Office Visit    1 year ago Colon cancer screening    [facility]    Office Visit    1 year ago Type 2 diabetes mellitus without complication, without long-term current use of insulin ([facility])    [facility]    [facility] Visit    1 year ago Acute bacterial conjunctivitis of left eye    [facility]    [facility] Visit                       Refused Prescriptions Disp Refills    ONETOUCH DELICA PLUS [id number] Does not apply Misc [Pharmacy Med Name: ONE TOUCH DELICA PLUS 33G LANCETS] 100 each 3     Si LANCET BY FINGER STICK ROUTE DAILY       Diabetic Supplies Protocol Passed - 2024 10:33 AM        Passed - In person appointment or virtual visit in the past 12 mos or appointment in next 3 mos     Recent Outpatient Visits              4 months ago Essential hypertension    [facility] Micheline Mcleod APRN    Office Visit    9 months ago Physical exam    [facility]    [facility] Visit    1 year ago Colon cancer screening    [facility] Ken Anderson MD    Office Visit    1 year ago Type 2 diabetes mellitus without complication, without long-term current use of insulin ([facility])    [facility]    Office Visit    1 year ago Acute bacterial conjunctivitis of left eye    [facility] Hong Azar MD    Office Visit                           Recent Outpatient Visits              4 months ago Essential hypertension    [facility]    Office Visit    9 months ago Physical exam    [facility]    [facility] Visit    1 year ago Colon cancer screening    [facility] MD    Office Visit    1 year ago Type 2 diabetes mellitus without complication, without long-term current use of insulin ([facility])    [facility]    Office Visit    1 year ago Acute bacterial conjunctivitis of left eye    [facility], Hong Azar MD    Office Visit

## 2024-11-08 NOTE — TELEPHONE ENCOUNTER
Refill passed per [facility] protocol.    Please advise- changed directions to match the test strips that stated 2 times per day    Requested Prescriptions   Pending Prescriptions Disp Refills    Lancets (ONETOUCH DELICA PLUS [id number]) Does not apply Misc 200 each 3     Si Lancet by Finger stick route 2 (two) times daily.       Diabetic Supplies Protocol Passed - 2024 10:34 AM        Passed - In person appointment or virtual visit in the past 12 mos or appointment in next 3 mos     Recent Outpatient Visits              4 months ago Essential hypertension    [facility]    Office Visit    9 months ago Physical exam    [facility]    Office Visit    1 year ago Colon cancer screening    [facility] Ken Anderson MD    Office Visit    1 year ago Type 2 diabetes mellitus without complication, without long-term current use of insulin ([facility])    [facility]    Office Visit    1 year ago Acute bacterial conjunctivitis of left eye    [facility]    Office Visit                       Signed Prescriptions Disp Refills    Glucose Blood (ONETOUCH ULTRA) In Vitro Strip 200 strip 3     Si each by In Vitro route 2 (two) times daily.       Diabetic Supplies Protocol Passed - 2024 10:34 AM        Passed - In person appointment or virtual visit in the past 12 mos or appointment in next 3 mos     Recent Outpatient Visits              4 months ago Essential hypertension    [facility], Micheline Mcleod APRN    Office Visit    9 months ago Physical exam    [facility]    [facility]    1 year ago  Sterilized pump pieces; Mother states she would like to start putting infant to breast again; Instructed to monitor for feeding cues and to call for assistance at the next feeding; Verbalized understanding   Colon cancer screening    [facility], Ken Anderson MD    Office Visit    1 year ago Type 2 diabetes mellitus without complication, without long-term current use of insulin ([facility])    [facility]    Office Visit    1 year ago Acute bacterial conjunctivitis of left eye    [facility]    [facility] Visit                       Refused Prescriptions Disp Refills    ONETOUCH DELICA PLUS [id number] Does not apply Misc [Pharmacy Med Name: ONE TOUCH DELICA PLUS 33G LANCETS] 100 each 3     Si LANCET BY FINGER STICK ROUTE DAILY       Diabetic Supplies Protocol Passed - 2024 10:34 AM        Passed - In person appointment or virtual visit in the past 12 mos or appointment in next 3 mos     Recent Outpatient Visits              4 months ago Essential hypertension    [facility], Micheline Mcleod APRN    Office Visit    9 months ago Physical exam    [facility]    Office Visit    1 year ago Colon cancer screening    [facility] MD    Office Visit    1 year ago Type 2 diabetes mellitus without complication, without long-term current use of insulin ([facility])    [facility]    Office Visit    1 year ago Acute bacterial conjunctivitis of left eye    [facility]    [facility] Visit                           Recent Outpatient Visits              4 months ago Essential hypertension    [facility] Micheline Mcleod APRN    Office Visit    9 months ago Physical exam    [facility]    Office Visit    1 year ago [facility] cancer screening    [facility]    Office Visit    1 year ago Type 2 diabetes mellitus without complication, without long-term current use of insulin (HCC)    [facility]    Office Visit    1 year ago Acute bacterial conjunctivitis of left eye    [facility] [place] Hong Oneill MD    Office Visit

## 2024-12-16 ENCOUNTER — PATIENT MESSAGE (OUTPATIENT)
Dept: INTERNAL MEDICINE CLINIC | Facility: CLINIC | Age: 54
End: 2024-12-16

## 2024-12-16 NOTE — TELEPHONE ENCOUNTER
Patient is currently scheduled for a nurse visit on 12/18/24 at [facility] for an injection.  There's nothing in appointment note that specifies what he'll be getting.  [employer] message sent to pt inquiring what injection he is planning on getting.

## 2024-12-18 ENCOUNTER — TELEPHONE (OUTPATIENT)
Dept: INTERNAL MEDICINE CLINIC | Facility: CLINIC | Age: 54
End: 2024-12-18

## 2024-12-18 NOTE — TELEPHONE ENCOUNTER
Pt  /86 doing better  Started on hydrochlorothiazide  He was suppose to get a bmp  Already ordered  adivsed to go to the lab   I ordered bmp again  Patient voiced understanding  and agrees with plan

## 2024-12-18 NOTE — TELEPHONE ENCOUNTER
Patient came into the office today for a nurse visit for an injection.  Patient was unsure what injection he was suppose to get or if he was due for any.  Stts that he was suppose to schedule for his annual visit and they booked him as a nurse visit.  Last office visit with Micheline on 6/24/24, and last physical was on 2/2/24.  Is patient due for any immunizations at this time?

## 2025-02-26 NOTE — TELEPHONE ENCOUNTER
Please kindly review this medication    [x] FAILS PROTOCOL    [] HAS NO PROTOCOL ATTACHED    Newly started medication 11/25/24    [employer] message sent, reminding patient of BMP lab orders placed 11/26/24 and needing to be completed.    Second order placed on 12/18/24, as well for BMP. Advised to only complete one of them.

## 2025-02-27 RX ORDER — HYDROCHLOROTHIAZIDE 12.5 MG/1
12.5 TABLET ORAL DAILY
Qty: 30 TABLET | Refills: 0 | Status: SHIPPED | OUTPATIENT
Start: 2025-02-27 | End: 2025-03-03

## 2025-03-03 RX ORDER — HYDROCHLOROTHIAZIDE 12.5 MG/1
12.5 TABLET ORAL DAILY
Qty: 90 TABLET | Refills: 0 | Status: SHIPPED | OUTPATIENT
Start: 2025-03-03

## 2025-03-15 DIAGNOSIS — E11.9 TYPE 2 DIABETES MELLITUS WITHOUT COMPLICATION, WITHOUT LONG-TERM CURRENT USE OF INSULIN (HCC): ICD-10-CM

## 2025-03-18 RX ORDER — BLOOD SUGAR DIAGNOSTIC
STRIP MISCELLANEOUS 2 TIMES DAILY
Qty: 200 STRIP | Refills: 3 | OUTPATIENT
Start: 2025-03-18

## 2025-04-10 ENCOUNTER — LAB ENCOUNTER (OUTPATIENT)
Dept: LAB | Age: 55
End: 2025-04-10
Attending: INTERNAL MEDICINE
Payer: COMMERCIAL

## 2025-04-10 DIAGNOSIS — I10 PRIMARY HYPERTENSION: ICD-10-CM

## 2025-04-10 DIAGNOSIS — I10 ESSENTIAL HYPERTENSION: ICD-10-CM

## 2025-04-10 LAB
ANION GAP SERPL CALC-SCNC: 8 MMOL/L (ref 0–18)
BUN BLD-MCNC: 15 MG/DL (ref 9–23)
BUN/CREAT SERPL: 14.7 (ref 10–20)
CALCIUM BLD-MCNC: 9.5 MG/DL (ref 8.7–10.4)
CHLORIDE SERPL-SCNC: 101 MMOL/L (ref 98–112)
CO2 SERPL-SCNC: 28 MMOL/L (ref 21–32)
CREAT BLD-MCNC: 1.02 MG/DL (ref 0.7–1.3)
EGFRCR SERPLBLD CKD-EPI 2021: 88 ML/MIN/1.73M2 (ref 60–?)
FASTING STATUS PATIENT QL REPORTED: YES
GLUCOSE BLD-MCNC: 266 MG/DL (ref 70–99)
OSMOLALITY SERPL CALC.SUM OF ELEC: 294 MOSM/KG (ref 275–295)
POTASSIUM SERPL-SCNC: 4 MMOL/L (ref 3.5–5.1)
SODIUM SERPL-SCNC: 137 MMOL/L (ref 136–145)

## 2025-04-10 PROCEDURE — 80048 BASIC METABOLIC PNL TOTAL CA: CPT

## 2025-04-10 PROCEDURE — 36415 COLL VENOUS BLD VENIPUNCTURE: CPT

## 2025-04-15 ENCOUNTER — OFFICE VISIT (OUTPATIENT)
Dept: INTERNAL MEDICINE CLINIC | Facility: CLINIC | Age: 55
End: 2025-04-15
Payer: COMMERCIAL

## 2025-04-15 ENCOUNTER — LAB ENCOUNTER (OUTPATIENT)
Dept: LAB | Age: 55
End: 2025-04-15
Attending: INTERNAL MEDICINE
Payer: COMMERCIAL

## 2025-04-15 VITALS
HEART RATE: 84 BPM | SYSTOLIC BLOOD PRESSURE: 132 MMHG | BODY MASS INDEX: 28.49 KG/M2 | TEMPERATURE: 97 F | DIASTOLIC BLOOD PRESSURE: 82 MMHG | HEIGHT: 68 IN | WEIGHT: 188 LBS

## 2025-04-15 DIAGNOSIS — Z12.5 SCREENING FOR PROSTATE CANCER: ICD-10-CM

## 2025-04-15 DIAGNOSIS — E11.9 TYPE 2 DIABETES MELLITUS WITHOUT COMPLICATION, WITHOUT LONG-TERM CURRENT USE OF INSULIN (HCC): ICD-10-CM

## 2025-04-15 DIAGNOSIS — Z00.00 PHYSICAL EXAM: ICD-10-CM

## 2025-04-15 DIAGNOSIS — Z00.00 PHYSICAL EXAM: Primary | ICD-10-CM

## 2025-04-15 LAB
ALBUMIN SERPL-MCNC: 4.9 G/DL (ref 3.2–4.8)
ALBUMIN/GLOB SERPL: 2 {RATIO} (ref 1–2)
ALP LIVER SERPL-CCNC: 60 U/L (ref 45–117)
ALT SERPL-CCNC: 36 U/L (ref 10–49)
ANION GAP SERPL CALC-SCNC: 7 MMOL/L (ref 0–18)
AST SERPL-CCNC: 29 U/L (ref ?–34)
BASOPHILS # BLD AUTO: 0.07 X10(3) UL (ref 0–0.2)
BASOPHILS NFR BLD AUTO: 1.3 %
BILIRUB SERPL-MCNC: 0.7 MG/DL (ref 0.3–1.2)
BILIRUB UR QL: NEGATIVE
BUN BLD-MCNC: 11 MG/DL (ref 9–23)
BUN/CREAT SERPL: 10 (ref 10–20)
CALCIUM BLD-MCNC: 9.5 MG/DL (ref 8.7–10.4)
CHLORIDE SERPL-SCNC: 102 MMOL/L (ref 98–112)
CHOLEST SERPL-MCNC: 150 MG/DL (ref ?–200)
CO2 SERPL-SCNC: 29 MMOL/L (ref 21–32)
COLOR UR: YELLOW
COMPLEXED PSA SERPL-MCNC: 0.52 NG/ML (ref ?–4)
CREAT BLD-MCNC: 1.1 MG/DL (ref 0.7–1.3)
CREAT UR-SCNC: 168.7 MG/DL
DEPRECATED RDW RBC AUTO: 37.6 FL (ref 35.1–46.3)
EGFRCR SERPLBLD CKD-EPI 2021: 80 ML/MIN/1.73M2 (ref 60–?)
EOSINOPHIL # BLD AUTO: 0.48 X10(3) UL (ref 0–0.7)
EOSINOPHIL NFR BLD AUTO: 9.2 %
ERYTHROCYTE [DISTWIDTH] IN BLOOD BY AUTOMATED COUNT: 11.1 % (ref 11–15)
FASTING PATIENT LIPID ANSWER: NO
FASTING STATUS PATIENT QL REPORTED: NO
GLOBULIN PLAS-MCNC: 2.4 G/DL (ref 2–3.5)
GLUCOSE BLD-MCNC: 243 MG/DL (ref 70–99)
GLUCOSE UR-MCNC: >1000 MG/DL
HCT VFR BLD AUTO: 41 % (ref 39–53)
HDLC SERPL-MCNC: 60 MG/DL (ref 40–59)
HGB BLD-MCNC: 14.5 G/DL (ref 13–17.5)
IMM GRANULOCYTES # BLD AUTO: 0.02 X10(3) UL (ref 0–1)
IMM GRANULOCYTES NFR BLD: 0.4 %
KETONES UR-MCNC: 10 MG/DL
LDLC SERPL CALC-MCNC: 55 MG/DL (ref ?–100)
LEUKOCYTE ESTERASE UR QL STRIP.AUTO: NEGATIVE
LYMPHOCYTES # BLD AUTO: 1.63 X10(3) UL (ref 1–4)
LYMPHOCYTES NFR BLD AUTO: 31.3 %
MCH RBC QN AUTO: 33 PG (ref 26–34)
MCHC RBC AUTO-ENTMCNC: 35.4 G/DL (ref 31–37)
MCV RBC AUTO: 93.4 FL (ref 80–100)
MICROALBUMIN UR-MCNC: 1.4 MG/DL
MICROALBUMIN/CREAT 24H UR-RTO: 8.3 UG/MG (ref ?–30)
MONOCYTES # BLD AUTO: 0.56 X10(3) UL (ref 0.1–1)
MONOCYTES NFR BLD AUTO: 10.8 %
NEUTROPHILS # BLD AUTO: 2.44 X10 (3) UL (ref 1.5–7.7)
NEUTROPHILS # BLD AUTO: 2.44 X10(3) UL (ref 1.5–7.7)
NEUTROPHILS NFR BLD AUTO: 47 %
NITRITE UR QL STRIP.AUTO: NEGATIVE
NONHDLC SERPL-MCNC: 90 MG/DL (ref ?–130)
OSMOLALITY SERPL CALC.SUM OF ELEC: 293 MOSM/KG (ref 275–295)
PH UR: 5.5 [PH] (ref 5–8)
PLATELET # BLD AUTO: 186 10(3)UL (ref 150–450)
POTASSIUM SERPL-SCNC: 4.2 MMOL/L (ref 3.5–5.1)
PROT SERPL-MCNC: 7.3 G/DL (ref 5.7–8.2)
PROT UR-MCNC: NEGATIVE MG/DL
RBC # BLD AUTO: 4.39 X10(6)UL (ref 4.3–5.7)
SODIUM SERPL-SCNC: 138 MMOL/L (ref 136–145)
SP GR UR STRIP: >1.03 (ref 1–1.03)
T4 FREE SERPL-MCNC: 1.2 NG/DL (ref 0.8–1.7)
TRIGL SERPL-MCNC: 224 MG/DL (ref 30–149)
TSI SER-ACNC: 2.68 UIU/ML (ref 0.55–4.78)
UROBILINOGEN UR STRIP-ACNC: NORMAL
VLDLC SERPL CALC-MCNC: 32 MG/DL (ref 0–30)
WBC # BLD AUTO: 5.2 X10(3) UL (ref 4–11)

## 2025-04-15 PROCEDURE — 84439 ASSAY OF FREE THYROXINE: CPT

## 2025-04-15 PROCEDURE — 81001 URINALYSIS AUTO W/SCOPE: CPT

## 2025-04-15 PROCEDURE — 36415 COLL VENOUS BLD VENIPUNCTURE: CPT

## 2025-04-15 PROCEDURE — 90471 IMMUNIZATION ADMIN: CPT | Performed by: INTERNAL MEDICINE

## 2025-04-15 PROCEDURE — 85025 COMPLETE CBC W/AUTO DIFF WBC: CPT

## 2025-04-15 PROCEDURE — 99396 PREV VISIT EST AGE 40-64: CPT | Performed by: INTERNAL MEDICINE

## 2025-04-15 PROCEDURE — 83036 HEMOGLOBIN GLYCOSYLATED A1C: CPT

## 2025-04-15 PROCEDURE — 90750 HZV VACC RECOMBINANT IM: CPT | Performed by: INTERNAL MEDICINE

## 2025-04-15 PROCEDURE — 3075F SYST BP GE 130 - 139MM HG: CPT | Performed by: INTERNAL MEDICINE

## 2025-04-15 PROCEDURE — 3079F DIAST BP 80-89 MM HG: CPT | Performed by: INTERNAL MEDICINE

## 2025-04-15 PROCEDURE — 3052F HG A1C>EQUAL 8.0%<EQUAL 9.0%: CPT | Performed by: INTERNAL MEDICINE

## 2025-04-15 PROCEDURE — 80061 LIPID PANEL: CPT

## 2025-04-15 PROCEDURE — 84443 ASSAY THYROID STIM HORMONE: CPT

## 2025-04-15 PROCEDURE — 3061F NEG MICROALBUMINURIA REV: CPT | Performed by: INTERNAL MEDICINE

## 2025-04-15 PROCEDURE — 3008F BODY MASS INDEX DOCD: CPT | Performed by: INTERNAL MEDICINE

## 2025-04-15 PROCEDURE — 82043 UR ALBUMIN QUANTITATIVE: CPT

## 2025-04-15 PROCEDURE — 80053 COMPREHEN METABOLIC PANEL: CPT

## 2025-04-15 PROCEDURE — 82570 ASSAY OF URINE CREATININE: CPT

## 2025-04-16 LAB
EST. AVERAGE GLUCOSE BLD GHB EST-MCNC: 200 MG/DL (ref 68–126)
HBA1C MFR BLD: 8.6 % (ref ?–5.7)

## 2025-04-26 DIAGNOSIS — E11.9 TYPE 2 DIABETES MELLITUS WITHOUT COMPLICATION, WITHOUT LONG-TERM CURRENT USE OF INSULIN (HCC): Primary | ICD-10-CM

## 2025-04-26 RX ORDER — METFORMIN HYDROCHLORIDE 500 MG/1
1000 TABLET, EXTENDED RELEASE ORAL 2 TIMES DAILY WITH MEALS
Qty: 360 TABLET | Refills: 3 | Status: SHIPPED | OUTPATIENT
Start: 2025-04-26

## 2025-04-28 NOTE — PROGRESS NOTES
HPI:    Patient ID: Carlos Tarango is a 54 year old male.    HPI    Physical exam  Generally healthy    Follow up diabetes  HTN  Due for blood work    /82   Pulse 84   Temp 97.2 °F (36.2 °C) (Temporal)   Ht 5' 8\" (1.727 m)   Wt 188 lb (85.3 kg)   BMI 28.59 kg/m²   Wt Readings from Last 6 Encounters:   04/15/25 188 lb (85.3 kg)   06/24/24 188 lb (85.3 kg)   02/02/24 190 lb (86.2 kg)   09/14/23 188 lb (85.3 kg)   09/12/23 190 lb (86.2 kg)   03/16/23 195 lb (88.5 kg)     Body mass index is 28.59 kg/m².  HGBA1C:    Lab Results   Component Value Date    A1C 8.6 (H) 04/15/2025    A1C 6.8 (H) 05/23/2024    A1C 6.8 (H) 02/01/2024     (H) 04/15/2025         Review of Systems   Constitutional: Negative.  Negative for appetite change, chills, fatigue and fever.   HENT:  Negative for congestion, ear discharge, ear pain, rhinorrhea, sinus pressure, sneezing, sore throat, trouble swallowing and voice change.    Eyes:  Negative for pain and discharge.   Respiratory:  Negative for cough, chest tightness, shortness of breath and wheezing.    Cardiovascular:  Negative for chest pain, palpitations and leg swelling.   Gastrointestinal:  Negative for abdominal distention, abdominal pain, blood in stool, constipation, diarrhea, nausea and vomiting.   Endocrine: Negative for cold intolerance and heat intolerance.   Genitourinary:  Negative for decreased urine volume, difficulty urinating, dysuria, frequency, hematuria and urgency.   Musculoskeletal:  Negative for arthralgias, back pain, gait problem and joint swelling.   Skin:  Negative for rash.   Allergic/Immunologic: Negative for environmental allergies and food allergies.   Neurological:  Negative for dizziness, tremors, seizures, weakness, light-headedness and headaches.   Hematological:  Negative for adenopathy. Does not bruise/bleed easily.   Psychiatric/Behavioral:  Negative for behavioral problems and confusion.          Current  Medications[1]  Allergies:Allergies[2]    HISTORY:  Past Medical History[3]   Past Surgical History[4]   Family History[5]   Social History: Short Social Hx on File[6]     PHYSICAL EXAM:    Physical Exam  Vitals and nursing note reviewed.   HENT:      Head: Normocephalic and atraumatic.      Right Ear: Ear canal normal.      Left Ear: Ear canal normal.      Nose: Nose normal.      Mouth/Throat:      Pharynx: No oropharyngeal exudate.   Eyes:      General: No scleral icterus.        Right eye: No discharge.         Left eye: No discharge.      Conjunctiva/sclera: Conjunctivae normal.      Pupils: Pupils are equal, round, and reactive to light.   Neck:      Thyroid: No thyromegaly.   Cardiovascular:      Rate and Rhythm: Regular rhythm.      Heart sounds: Normal heart sounds. No murmur heard.  Pulmonary:      Effort: Pulmonary effort is normal. No respiratory distress.      Breath sounds: Normal breath sounds. No wheezing or rales.   Abdominal:      General: Bowel sounds are normal.      Palpations: There is no mass.      Tenderness: There is no abdominal tenderness. There is no rebound.      Hernia: No hernia is present.   Musculoskeletal:         General: No tenderness.      Cervical back: Neck supple.   Lymphadenopathy:      Cervical: No cervical adenopathy.   Skin:     General: Skin is warm.      Findings: No rash.   Neurological:      Mental Status: He is alert.      Cranial Nerves: No cranial nerve deficit.   Psychiatric:         Behavior: Behavior normal.              ASSESSMENT/PLAN:   (Z00.00) Physical exam  (primary encounter diagnosis)  Plan: CBC With Differential With Platelet, Comp         Metabolic Panel (14), Lipid Panel, Hemoglobin         A1C, TSH and Free T4, Urinalysis, Routine       Generally healthy      (E11.9) Type 2 diabetes mellitus without complication, without long-term current use of insulin (HCC)  Plan: OPHTHALMOLOGY - INTERNAL, Microalb/Creat Ratio,        Random Urine, ENDOCRINOLOGY -  INTERNAL        Follow up with endocrinology  Labs ordered  Eye exam due referral given    (Z12.5) Screening for prostate cancer  Plan: PSA (Screening) [E]        Asymptomatic  monitor    Patient voiced understanding  and agrees with plan         Orders Placed This Encounter   Procedures    CBC With Differential With Platelet    Comp Metabolic Panel (14)    Lipid Panel    Hemoglobin A1C    TSH and Free T4    Urinalysis, Routine    Microalb/Creat Ratio, Random Urine    PSA (Screening) [E]    Zoster Recombinant Adjuvanted (Shingrix -Shingles) [73011]       Meds This Visit:  Requested Prescriptions      No prescriptions requested or ordered in this encounter       Imaging & Referrals:  OPHTHALMOLOGY - INTERNAL  ENDOCRINOLOGY - INTERNAL  ZOSTER VACC RECOMBINANT IM NJX        ID#[id number]           [1]   Current Outpatient Medications   Medication Sig Dispense Refill    HYDROCHLOROTHIAZIDE 12.5 MG Oral Tab TAKE 1 TABLET BY MOUTH EVERY DAY 90 tablet 0    Glucose Blood (ONETOUCH ULTRA) In Vitro Strip 1 each by In Vitro route 2 (two) times daily. 200 strip 3    Lancets (ONETOUCH DELICA PLUS [id number]) Does not apply Misc 1 Lancet by Finger stick route 2 (two) times daily. 200 each 3    amLODIPine 5 MG Oral Tab Take 1 tablet (5 mg total) by mouth daily. 90 tablet 3    losartan 100 MG Oral Tab Take 1 tablet (100 mg total) by mouth daily. 90 tablet 3    atorvastatin 10 MG Oral Tab Take 1 tablet (10 mg total) by mouth nightly. 90 tablet 3    Lancets (ONETOUCH DELICA PLUS [id number]) Does not apply Misc 1 Lancet by Finger stick route daily. 100 each 3    aspirin 81 MG Oral Tab EC Take 1 tablet (81 mg total) by mouth daily.      FISH OIL-KRILL OIL OR Take by mouth.      Blood Glucose Monitoring Suppl (ONETOUCH ULTRA 2) w/Device Does not apply Kit 1 kit daily. 1 kit 0    metFORMIN  MG Oral Tablet 24 Hr Take 2 tablets (1,000 mg total) by mouth 2 (two) times daily with meals. 360 tablet 3   [2]   Allergies  Allergen Reactions     Seasonal OTHER (SEE COMMENTS)     Sneezing and dry throat.   [3]   Past Medical History:   Diabetes (HCC)    Diabetic acidosis, type I (HCC)    Esophageal reflux    High blood pressure    High cholesterol    Malaria   [4]   Past Surgical History:  Procedure Laterality Date    Colonoscopy N/A 9/21/2023    Procedure: COLONOSCOPY;  Surgeon: Ken Kamara MD;  Location[facility] MAIN OR   [5]   Family History  Problem Relation Age of Onset    Diabetes Maternal Grandfather     Hypertension Father     Pulmonary Disease Father         COPD    Diabetes Mother     Hypertension Mother    [6]   Social History  Socioeconomic History    Marital status:    Tobacco Use    Smoking status: Never     Passive exposure: Never    Smokeless tobacco: Never   Vaping Use    Vaping status: Never Used   Substance and Sexual Activity    Alcohol use: Yes     Alcohol/week: 2.0 standard drinks of alcohol     Types: 2 Standard drinks or equivalent per week     Comment: Daily    Drug use: Not Currently   Other Topics Concern    Caffeine Concern Yes     Comment: Coffee 1 cup daily     Social Drivers of Health     Food Insecurity: No Food Insecurity (4/15/2025)    NCSS - Food Insecurity     Worried About Running Out of Food in the Last Year: No     Ran Out of Food in the Last Year: No   Transportation Needs: No Transportation Needs (4/15/2025)    NCSS - Transportation     Lack of Transportation: No   Housing Stability: At Risk (4/15/2025)    NCSS - Housing/Utilities     Has Housing: Yes     Worried About Losing Housing: No     Unable to Get Utilities: Yes

## 2025-05-06 RX ORDER — ATORVASTATIN CALCIUM 10 MG/1
10 TABLET, FILM COATED ORAL NIGHTLY
Qty: 90 TABLET | Refills: 3 | Status: SHIPPED | OUTPATIENT
Start: 2025-05-06

## 2025-05-13 RX ORDER — AMLODIPINE BESYLATE 5 MG/1
5 TABLET ORAL DAILY
Qty: 90 TABLET | Refills: 3 | Status: SHIPPED | OUTPATIENT
Start: 2025-05-13

## 2025-05-16 ENCOUNTER — TELEPHONE (OUTPATIENT)
Dept: INTERNAL MEDICINE CLINIC | Facility: CLINIC | Age: 55
End: 2025-05-16

## 2025-05-16 NOTE — TELEPHONE ENCOUNTER
LVM for stating if he has completed yearly diabetic exam. If so, if he can have exam faxed to us, number provided. Otherwise, offered retina scan in-office. If interested, he can call back office to schedule.

## 2025-06-03 RX ORDER — HYDROCHLOROTHIAZIDE 12.5 MG/1
12.5 TABLET ORAL DAILY
Qty: 90 TABLET | Refills: 3 | Status: SHIPPED | OUTPATIENT
Start: 2025-06-03

## 2025-06-03 RX ORDER — LOSARTAN POTASSIUM 100 MG/1
100 TABLET ORAL DAILY
Qty: 90 TABLET | Refills: 3 | Status: SHIPPED | OUTPATIENT
Start: 2025-06-03

## 2025-07-22 ENCOUNTER — TELEPHONE (OUTPATIENT)
Dept: INTERNAL MEDICINE CLINIC | Facility: CLINIC | Age: 55
End: 2025-07-22

## 2025-07-22 NOTE — TELEPHONE ENCOUNTER
Reached out to patient regarding diabetic eye exam. Patient was given ophthalmology referral for Dr. Breanne Johnston, has not scheduled an appointment yet. Offered to schedule in-office retina scan, declined at this time as he would rather schedule with Dr. Johnston. Patient to call and set up appointment with them, no further questions/concerns.

## (undated) NOTE — LETTER
08/11/18        [place] [address] [id number]      Dear Liang Pak,    [address] records indicate that you have outstanding lab work and or testing that was ordered for you and has not yet been completed:          Assay, Thyroid Stim

## (undated) NOTE — LETTER
07/22/25        Hello,    This is the [facility], office of Dr. Zechariah Klein    Thank you for putting your trust in [facility].  Our goal is to deliver the highest quality healthcare and an exceptional patient experience.  Upon reviewing of your medical record shows you are due for the following:        Diabetic A1C follow up    Diabetic Foot Exam        Please call [phone] to schedule your appointment or schedule online via [employer].    If you changed to a new provider at another facility, please notify the clinic to update your records.    If you had any recent testing at another facility, please have your results faxed to our office at [phone].      Thank you and have a great day!